# Patient Record
Sex: MALE | Race: BLACK OR AFRICAN AMERICAN | NOT HISPANIC OR LATINO | URBAN - METROPOLITAN AREA
[De-identification: names, ages, dates, MRNs, and addresses within clinical notes are randomized per-mention and may not be internally consistent; named-entity substitution may affect disease eponyms.]

---

## 2019-01-18 ENCOUNTER — INPATIENT (INPATIENT)
Facility: HOSPITAL | Age: 44
LOS: 1 days | Discharge: HOME | End: 2019-01-20
Attending: INTERNAL MEDICINE | Admitting: INTERNAL MEDICINE
Payer: COMMERCIAL

## 2019-01-18 VITALS
RESPIRATION RATE: 16 BRPM | TEMPERATURE: 98 F | SYSTOLIC BLOOD PRESSURE: 137 MMHG | DIASTOLIC BLOOD PRESSURE: 88 MMHG | HEART RATE: 83 BPM | OXYGEN SATURATION: 100 %

## 2019-01-18 RX ORDER — PANTOPRAZOLE SODIUM 20 MG/1
40 TABLET, DELAYED RELEASE ORAL ONCE
Qty: 0 | Refills: 0 | Status: COMPLETED | OUTPATIENT
Start: 2019-01-18 | End: 2019-01-18

## 2019-01-18 RX ORDER — ONDANSETRON 8 MG/1
4 TABLET, FILM COATED ORAL ONCE
Qty: 0 | Refills: 0 | Status: COMPLETED | OUTPATIENT
Start: 2019-01-18 | End: 2019-01-18

## 2019-01-18 RX ORDER — MORPHINE SULFATE 50 MG/1
2 CAPSULE, EXTENDED RELEASE ORAL ONCE
Qty: 0 | Refills: 0 | Status: DISCONTINUED | OUTPATIENT
Start: 2019-01-18 | End: 2019-01-18

## 2019-01-18 RX ORDER — SODIUM CHLORIDE 9 MG/ML
1000 INJECTION INTRAMUSCULAR; INTRAVENOUS; SUBCUTANEOUS ONCE
Qty: 0 | Refills: 0 | Status: COMPLETED | OUTPATIENT
Start: 2019-01-18 | End: 2019-01-18

## 2019-01-18 NOTE — ED PROVIDER NOTE - PHYSICAL EXAMINATION
GEN: Well appearing, well nourished, in no apparent distress.  HEAD: NCAT  HEENT: PERRL, Airway patent, non-erythematous pharynx, no exudates, uvula midline, MMM, neck supple, no LAD, no JVD, no scleral icterus   LUNG: CTAB, no adventitious sounds, no retractions, no nasal flaring  CV: RRR, no murmurs,   Abd: soft, TTP epigastric, ND, no rebound or guarding, BS+ in all quadrants, no CVAT  MSK: WWP, Pulses 2+ in extremities, No edema   Neuro:  AAOx3, Ambulatory with stable gait.  Skin: Warm and dry, no evidence of rash  Psych: normal mood and affect

## 2019-01-18 NOTE — ED PROVIDER NOTE - NS ED ROS FT
Constitutional: no fevers, no chills.  Eyes: no visual changes.  Ears: no ear drainage, no ear pain.  Nose: no nasal congestion.  Mouth/Throat: no sore throat.  Cardiovascular: no chest pain.  Respiratory: no shortness of breath, no wheezing, no cough  Gastrointestinal: no nausea, +vomiting, no diarrhea, +abdominal pain. +dark stools   MSK: no flank pain, no back pain.  Genitourinary: no dysuria, no hematuria.  Skin: no rashes.  Neuro: no headache,   Psychiatric: no known mental health issues.

## 2019-01-18 NOTE — ED PROVIDER NOTE - PROGRESS NOTE DETAILS
Dr Darling: + occult blood. Dr Darilng: + occult blood. RUQ shows dilated pancreatic duct and cholelithiasis. surgery and GI on board.

## 2019-01-18 NOTE — ED PROVIDER NOTE - OBJECTIVE STATEMENT
43 hx gerd and gallstones presents with epigastric pain since sunday. 1 episode of emesis that had some red streaks and some dark stools. Symptoms started after he ate food that he made, meatloaf. no one else ate same food. Denies f/x, cp, sob, dysuria, ha, change in skin,

## 2019-01-18 NOTE — ED PROVIDER NOTE - MEDICAL DECISION MAKING DETAILS
43 hx gerd and gallstones presents with epigastric pain since sunday. r/o cholecystitis with ruq sono. morphine for pain, zofran and pepcid, ivf, basic, lipase, t and s, h and h, occult blood, ekg

## 2019-01-18 NOTE — ED PROVIDER NOTE - ATTENDING CONTRIBUTION TO CARE
DR. GALLO, ATTENDING MD-  I performed a face to face bedside interview with patient regarding history of present illness, review of symptoms and past medical history. I completed an independent physical exam.  I have discussed patient's plan of care with the resident.   Documentation as above in the note.    44 y/o male h/o known gallstones here with abd pain, n/v x4 days.  Denies ha, neck stiffness, cp, sob, cough, dysuria, rash.  Afebrile, vs wnl, nad, ctabil, s1s2 rrr no m/r/g, abd soft ruq ttp no r/g, no cva tenderness b/l, no leg swelling b/l, no rash.  Biliary colic vs cholecystitis vs viral gastro.  Obtain cbc, cmp, lipase, vbg, ruq u/s, give ivf, pain med, antiemetic, reassess.

## 2019-01-19 ENCOUNTER — TRANSCRIPTION ENCOUNTER (OUTPATIENT)
Age: 44
End: 2019-01-19

## 2019-01-19 ENCOUNTER — RESULT REVIEW (OUTPATIENT)
Age: 44
End: 2019-01-19

## 2019-01-19 DIAGNOSIS — R10.9 UNSPECIFIED ABDOMINAL PAIN: ICD-10-CM

## 2019-01-19 LAB
ALBUMIN SERPL ELPH-MCNC: 4 G/DL — SIGNIFICANT CHANGE UP (ref 3.3–5)
ALP SERPL-CCNC: 67 U/L — SIGNIFICANT CHANGE UP (ref 40–120)
ALT FLD-CCNC: 43 U/L — HIGH (ref 4–41)
ANION GAP SERPL CALC-SCNC: 12 MMO/L — SIGNIFICANT CHANGE UP (ref 7–14)
APTT BLD: 33.7 SEC — SIGNIFICANT CHANGE UP (ref 27.5–36.3)
AST SERPL-CCNC: 30 U/L — SIGNIFICANT CHANGE UP (ref 4–40)
BASOPHILS # BLD AUTO: 0.04 K/UL — SIGNIFICANT CHANGE UP (ref 0–0.2)
BASOPHILS NFR BLD AUTO: 0.5 % — SIGNIFICANT CHANGE UP (ref 0–2)
BILIRUB SERPL-MCNC: 0.7 MG/DL — SIGNIFICANT CHANGE UP (ref 0.2–1.2)
BLD GP AB SCN SERPL QL: NEGATIVE — SIGNIFICANT CHANGE UP
BUN SERPL-MCNC: 12 MG/DL — SIGNIFICANT CHANGE UP (ref 7–23)
CALCIUM SERPL-MCNC: 9.8 MG/DL — SIGNIFICANT CHANGE UP (ref 8.4–10.5)
CHLORIDE SERPL-SCNC: 100 MMOL/L — SIGNIFICANT CHANGE UP (ref 98–107)
CO2 SERPL-SCNC: 27 MMOL/L — SIGNIFICANT CHANGE UP (ref 22–31)
CREAT SERPL-MCNC: 1.36 MG/DL — HIGH (ref 0.5–1.3)
EOSINOPHIL # BLD AUTO: 0.16 K/UL — SIGNIFICANT CHANGE UP (ref 0–0.5)
EOSINOPHIL NFR BLD AUTO: 2 % — SIGNIFICANT CHANGE UP (ref 0–6)
GLUCOSE SERPL-MCNC: 105 MG/DL — HIGH (ref 70–99)
HCT VFR BLD CALC: 46.4 % — SIGNIFICANT CHANGE UP (ref 39–50)
HGB BLD-MCNC: 14.8 G/DL — SIGNIFICANT CHANGE UP (ref 13–17)
IMM GRANULOCYTES NFR BLD AUTO: 0.3 % — SIGNIFICANT CHANGE UP (ref 0–1.5)
INR BLD: 1.05 — SIGNIFICANT CHANGE UP (ref 0.88–1.17)
LIDOCAIN IGE QN: 21.2 U/L — SIGNIFICANT CHANGE UP (ref 7–60)
LYMPHOCYTES # BLD AUTO: 1.64 K/UL — SIGNIFICANT CHANGE UP (ref 1–3.3)
LYMPHOCYTES # BLD AUTO: 20.9 % — SIGNIFICANT CHANGE UP (ref 13–44)
MCHC RBC-ENTMCNC: 29.9 PG — SIGNIFICANT CHANGE UP (ref 27–34)
MCHC RBC-ENTMCNC: 31.9 % — LOW (ref 32–36)
MCV RBC AUTO: 93.7 FL — SIGNIFICANT CHANGE UP (ref 80–100)
MONOCYTES # BLD AUTO: 1.03 K/UL — HIGH (ref 0–0.9)
MONOCYTES NFR BLD AUTO: 13.2 % — SIGNIFICANT CHANGE UP (ref 2–14)
NEUTROPHILS # BLD AUTO: 4.94 K/UL — SIGNIFICANT CHANGE UP (ref 1.8–7.4)
NEUTROPHILS NFR BLD AUTO: 63.1 % — SIGNIFICANT CHANGE UP (ref 43–77)
NRBC # FLD: 0 K/UL — LOW (ref 25–125)
OB PNL STL: POSITIVE — SIGNIFICANT CHANGE UP
PLATELET # BLD AUTO: 298 K/UL — SIGNIFICANT CHANGE UP (ref 150–400)
PMV BLD: 10.4 FL — SIGNIFICANT CHANGE UP (ref 7–13)
POTASSIUM SERPL-MCNC: 4.2 MMOL/L — SIGNIFICANT CHANGE UP (ref 3.5–5.3)
POTASSIUM SERPL-SCNC: 4.2 MMOL/L — SIGNIFICANT CHANGE UP (ref 3.5–5.3)
PROT SERPL-MCNC: 6.8 G/DL — SIGNIFICANT CHANGE UP (ref 6–8.3)
PROTHROM AB SERPL-ACNC: 12 SEC — SIGNIFICANT CHANGE UP (ref 9.8–13.1)
RBC # BLD: 4.95 M/UL — SIGNIFICANT CHANGE UP (ref 4.2–5.8)
RBC # FLD: 12.6 % — SIGNIFICANT CHANGE UP (ref 10.3–14.5)
RH IG SCN BLD-IMP: POSITIVE — SIGNIFICANT CHANGE UP
SODIUM SERPL-SCNC: 139 MMOL/L — SIGNIFICANT CHANGE UP (ref 135–145)
WBC # BLD: 7.83 K/UL — SIGNIFICANT CHANGE UP (ref 3.8–10.5)
WBC # FLD AUTO: 7.83 K/UL — SIGNIFICANT CHANGE UP (ref 3.8–10.5)

## 2019-01-19 PROCEDURE — 99232 SBSQ HOSP IP/OBS MODERATE 35: CPT | Mod: GC

## 2019-01-19 PROCEDURE — 99222 1ST HOSP IP/OBS MODERATE 55: CPT | Mod: GC,57

## 2019-01-19 PROCEDURE — 74183 MRI ABD W/O CNTR FLWD CNTR: CPT | Mod: 26

## 2019-01-19 PROCEDURE — 76705 ECHO EXAM OF ABDOMEN: CPT | Mod: 26

## 2019-01-19 RX ORDER — HEPARIN SODIUM 5000 [USP'U]/ML
5000 INJECTION INTRAVENOUS; SUBCUTANEOUS EVERY 8 HOURS
Qty: 0 | Refills: 0 | Status: DISCONTINUED | OUTPATIENT
Start: 2019-01-19 | End: 2019-01-20

## 2019-01-19 RX ORDER — ONDANSETRON 8 MG/1
4 TABLET, FILM COATED ORAL ONCE
Qty: 0 | Refills: 0 | Status: COMPLETED | OUTPATIENT
Start: 2019-01-19 | End: 2019-01-19

## 2019-01-19 RX ORDER — MORPHINE SULFATE 50 MG/1
4 CAPSULE, EXTENDED RELEASE ORAL ONCE
Qty: 0 | Refills: 0 | Status: DISCONTINUED | OUTPATIENT
Start: 2019-01-19 | End: 2019-01-19

## 2019-01-19 RX ORDER — ENOXAPARIN SODIUM 100 MG/ML
40 INJECTION SUBCUTANEOUS DAILY
Qty: 0 | Refills: 0 | Status: DISCONTINUED | OUTPATIENT
Start: 2019-01-19 | End: 2019-01-19

## 2019-01-19 RX ORDER — ONDANSETRON 8 MG/1
4 TABLET, FILM COATED ORAL EVERY 6 HOURS
Qty: 0 | Refills: 0 | Status: DISCONTINUED | OUTPATIENT
Start: 2019-01-19 | End: 2019-01-20

## 2019-01-19 RX ORDER — CEFOTETAN DISODIUM 1 G
VIAL (EA) INJECTION
Qty: 0 | Refills: 0 | Status: DISCONTINUED | OUTPATIENT
Start: 2019-01-19 | End: 2019-01-20

## 2019-01-19 RX ORDER — CEFOTETAN DISODIUM 1 G
2 VIAL (EA) INJECTION EVERY 12 HOURS
Qty: 0 | Refills: 0 | Status: DISCONTINUED | OUTPATIENT
Start: 2019-01-19 | End: 2019-01-20

## 2019-01-19 RX ORDER — CEFOTETAN DISODIUM 1 G
2 VIAL (EA) INJECTION ONCE
Qty: 0 | Refills: 0 | Status: COMPLETED | OUTPATIENT
Start: 2019-01-19 | End: 2019-01-19

## 2019-01-19 RX ORDER — ACETAMINOPHEN 500 MG
1000 TABLET ORAL ONCE
Qty: 0 | Refills: 0 | Status: DISCONTINUED | OUTPATIENT
Start: 2019-01-19 | End: 2019-01-20

## 2019-01-19 RX ORDER — SODIUM CHLORIDE 9 MG/ML
1000 INJECTION, SOLUTION INTRAVENOUS
Qty: 0 | Refills: 0 | Status: DISCONTINUED | OUTPATIENT
Start: 2019-01-19 | End: 2019-01-20

## 2019-01-19 RX ADMIN — MORPHINE SULFATE 4 MILLIGRAM(S): 50 CAPSULE, EXTENDED RELEASE ORAL at 04:20

## 2019-01-19 RX ADMIN — Medication 100 GRAM(S): at 21:34

## 2019-01-19 RX ADMIN — Medication 100 GRAM(S): at 10:06

## 2019-01-19 RX ADMIN — ONDANSETRON 4 MILLIGRAM(S): 8 TABLET, FILM COATED ORAL at 00:44

## 2019-01-19 RX ADMIN — ENOXAPARIN SODIUM 40 MILLIGRAM(S): 100 INJECTION SUBCUTANEOUS at 05:55

## 2019-01-19 RX ADMIN — SODIUM CHLORIDE 1000 MILLILITER(S): 9 INJECTION INTRAMUSCULAR; INTRAVENOUS; SUBCUTANEOUS at 02:22

## 2019-01-19 RX ADMIN — MORPHINE SULFATE 2 MILLIGRAM(S): 50 CAPSULE, EXTENDED RELEASE ORAL at 01:00

## 2019-01-19 RX ADMIN — MORPHINE SULFATE 4 MILLIGRAM(S): 50 CAPSULE, EXTENDED RELEASE ORAL at 02:22

## 2019-01-19 RX ADMIN — SODIUM CHLORIDE 1000 MILLILITER(S): 9 INJECTION INTRAMUSCULAR; INTRAVENOUS; SUBCUTANEOUS at 00:46

## 2019-01-19 RX ADMIN — MORPHINE SULFATE 2 MILLIGRAM(S): 50 CAPSULE, EXTENDED RELEASE ORAL at 00:44

## 2019-01-19 RX ADMIN — ONDANSETRON 4 MILLIGRAM(S): 8 TABLET, FILM COATED ORAL at 02:22

## 2019-01-19 RX ADMIN — PANTOPRAZOLE SODIUM 40 MILLIGRAM(S): 20 TABLET, DELAYED RELEASE ORAL at 00:46

## 2019-01-19 RX ADMIN — SODIUM CHLORIDE 125 MILLILITER(S): 9 INJECTION, SOLUTION INTRAVENOUS at 05:57

## 2019-01-19 NOTE — H&P ADULT - HISTORY OF PRESENT ILLNESS
43M no PMH PSH presents with 4 days of abdominal pain. The pain is in the upper abdomen and radiates around the RUQ to the back. The pain started after a meal 4 days ago and has been intermittent but worsened over the past day and now associated with emesis. Patient endorses chills, no cp/sob/d.

## 2019-01-19 NOTE — CONSULT NOTE ADULT - ASSESSMENT
IMPRESSION  - Abdominal pain in the setting of acute cholecystitis   - US concerning for dilated proximal pancreatic duct   - ANG, creat 1.3    RECOMMENDATION    - trend CBC, CMP  - please obtain an MRCP to evaluate the pancreatic duct and CBD  - continue IVF, consider antibiotics (broad spectrum)  - supportive care as per primary team      Ijeoma Ball, PGY-5  GI fellow  B- [id number]/ [phone]  Please call GI fellow on call after 5pm and on weekends IMPRESSION  - Abdominal pain in the setting of possible acute cholecystitis Ddx PUD, erosive gastritis  - US concerning for dilated proximal pancreatic duct   - ANG, creat 1.3    RECOMMENDATION    - trend CBC, CMP  - pending MRI/MRCP to evaluate the pancreatic duct and CBD  - continue IVF, consider antibiotics (broad spectrum)  - supportive care as per primary team      Ijeoma Ball, PGY-5  GI fellow  B- [id number]/ [phone]  Please call GI fellow on call after 5pm and on weekends IMPRESSION  - Abdominal pain in the setting gallstones; afebrile, T bili is normal, MRCP with no choledocholithiasis   - US concerning for dilated proximal pancreatic duct, MRCP with normal pancreatic duct and CBD  - ANG, creat 1.3    RECOMMENDATION    - trend CBC, CMP  - no plan for endoscopic intervention   - further care per surgery   - supportive care as per primary team    Please call back as needed     Ijeoma Ball, PGY-5  GI fellow  B- [id number]/ [phone]  Please call GI fellow on call after 5pm and on weekends IMPRESSION  - Abdominal pain in the setting gallstones; afebrile, T bili is normal, MRCP with no choledocholithiasis   - US concerning for dilated proximal pancreatic duct, MRCP with normal pancreatic duct and CBD  - ANG, creat 1.3    RECOMMENDATION    - trend CBC, CMP  - no plan for endoscopic intervention   - further care per surgery   - supportive care as per primary team    Please call back as needed.    Ijeoma Ball, PGY-5  GI fellow  B- [phone]/ [phone]  Please call GI fellow on call after 5pm and on weekends

## 2019-01-19 NOTE — H&P ADULT - ASSESSMENT
43M no pmh with acute cholecystitis    - Admit to Dr. Mccarthy  - IV cefotetan  - NPO  - IVF  - Pain control IV prn  - Nausea control IV prn  - Pre-op  - Will discuss with attending possibility of MRI 2/2 dilated ducts    B team

## 2019-01-19 NOTE — ED ADULT NURSE REASSESSMENT NOTE - NS ED NURSE REASSESS COMMENT FT1
Pt received room 11.  AOX4, skin warm, dry and intact.  Pt currently w/o complaints.  Abdomen, soft and non-tender at this time.  Right AC 20 with LR in progress.  Site is dry and intact.  Pt instructed on need to be NPO, pt verbalized understanding of same.  Pt to MRI via stretcher.

## 2019-01-19 NOTE — H&P ADULT - NSHPPHYSICALEXAM_GEN_ALL_CORE
PHYSICAL EXAM:  GENERAL: NAD, well-developed  HEAD:  Atraumatic, Normocephalic  EYES: EOMI, conjunctiva and sclera clear  NECK: Supple, No JVD  CHEST/LUNG: Clear to auscultation bilaterally; No wheeze  HEART: Regular rate and rhythm; No murmurs, rubs, or gallops  ABDOMEN: Soft, tender in the RUQ and epigastrum   EXTREMITIES:  2+ Peripheral Pulses, No clubbing, cyanosis, or edema  PSYCH: AAOx3  NEUROLOGY: non-focal  SKIN: No rashes or lesions

## 2019-01-19 NOTE — H&P ADULT - ATTENDING COMMENTS
I saw and examined the patient and agree with the above note.    Patient with exam consistent with acute calculous cholecystitis but with slight ductal dilation (0.7mm):  -OR planning for laparoscopic cholecystectomy only after MRCP  -Abx, resuscitation and labs  -NPO/IVF  -Pain control via IV meds    I spent 45min reviewing data, images and documentation as well as in care coordination.  Greater than 50% of my time was spent in discussion regarding pre- and post-operative care as well as risk and benefits of operative intervention.    Ronnie Mccarthy MD   Acute and Critical Care Surgery  (Cell: [phone])  Email: [email or web address]    The Acute Care Surgery (B Team) Attending Group Practice:  Dr. Oswaldo Friedman, Dr. Ventura Lawson, Dr. Yenni Alejo, Dr. Ronnie Mccarthy    Urgent issues - spectra [id number] or [id number]  Nonurgent issues - [phone]  Patient appointments or after hours - [phone]

## 2019-01-19 NOTE — CONSULT NOTE ADULT - SUBJECTIVE AND OBJECTIVE BOX
Chief Complaint:  Patient is a 43y old  Male who presents with a chief complaint of Cholecystitis (19 Jan 2019 04:17)      HPI:  43 year old male with no significant past medical history presented with abdominal pain for 4 days.    As per the patient, pain is in the upper abdomen and radiates around the RUQ to the back. The pain started after a meal 4 days ago and has been intermittent but worsened over the past day and now associated with emesis.       Allergies:  No Known Allergies      Home Medications:  No Current Medications as of 19-Jan-2019 04:19 documented in Structured Notes        Hospital Medications:  acetaminophen  IVPB .. 1000 milliGRAM(s) IV Intermittent Once PRN  heparin  Injectable 5000 Unit(s) SubCutaneous every 8 hours  lactated ringers. 1000 milliLiter(s) IV Continuous <Continuous>  ondansetron Injectable 4 milliGRAM(s) IV Push every 6 hours PRN      PMHX/PSHX:  No pertinent past medical history  No significant past surgical history      Family history:  No pertinent family history in first degree relatives      Social History:   Socially drinks alcohol  Denies smoking or drug use    ROS:     General:  No wt loss, fevers, chills, night sweats, fatigue,   Eyes:  Good vision, no reported pain  ENT:  No sore throat, pain, runny nose, dysphagia  CV:  No pain, palpitations, hypo/hypertension  Resp:  No dyspnea, cough, tachypnea, wheezing  GI:  See HPI  :  No pain, bleeding, incontinence, nocturia  Muscle:  No pain, weakness  Neuro:  No weakness, tingling, memory problems  Psych:  No fatigue, insomnia, mood problems, depression  Endocrine:  No polyuria, polydipsia, cold/heat intolerance  Heme:  No petechiae, ecchymosis, easy bruisability  Skin:  No rash, edema      PHYSICAL EXAM:     GENERAL:  Appears stated age, well-groomed, well-nourished, no distress  HEENT:  NC/AT,  conjunctivae clear and pink,  no JVD  CHEST:  Full & symmetric excursion, no increased effort, breath sounds clear  HEART:  Regular rhythm, S1, S2, no murmur/rub/S3/S4, no abdominal bruit, no edema  ABDOMEN:  Soft, non-tender, non-distended, normoactive bowel sounds,  no masses ,  EXTREMITIES:  no cyanosis,clubbing or edema  SKIN:  No rash/erythema/ecchymoses/petechiae/wounds/abscess/warm/dry  NEURO:  Alert, oriented    Vital Signs:  Vital Signs Last 24 Hrs  T(C): 36.4 (19 Jan 2019 08:24), Max: 36.9 (18 Jan 2019 23:17)  T(F): 97.6 (19 Jan 2019 08:24), Max: 98.5 (18 Jan 2019 23:17)  HR: 79 (19 Jan 2019 08:24) (62 - 84)  BP: 119/64 (19 Jan 2019 08:24) (119/64 - 164/85)  BP(mean): --  RR: 16 (19 Jan 2019 08:24) (16 - 16)  SpO2: 100% (19 Jan 2019 08:24) (100% - 100%)  Daily     Daily     LABS:                        14.8   7.83  )-----------( 298      ( 19 Jan 2019 00:45 )             46.4     01-19    139  |  100  |  12  ----------------------------<  105<H>  4.2   |  27  |  1.36<H>    Ca    9.8      19 Jan 2019 00:40    TPro  6.8  /  Alb  4.0  /  TBili  0.7  /  DBili  x   /  AST  30  /  ALT  43<H>  /  AlkPhos  67  01-19    LIVER FUNCTIONS - ( 19 Jan 2019 00:40 )  Alb: 4.0 g/dL / Pro: 6.8 g/dL / ALK PHOS: 67 u/L / ALT: 43 u/L / AST: 30 u/L / GGT: x           PT/INR - ( 19 Jan 2019 00:35 )   PT: 12.0 SEC;   INR: 1.05          PTT - ( 19 Jan 2019 00:35 )  PTT:33.7 SEC    Amylase Serum--      Lipase serum21.2       Ammonia--      Imaging:    < from: US Abdomen Limited (01.19.19 @ 03:19) >    LIVER: 17.1 cm in length. Diffusely increased echogenicity, likely fatty   infiltration.  BILIARY: No intrahepatic biliary dilatation. Dilated visualized proximal   common bile duct measuring up to 0.7 cm.   GALLBLADDER: Cholelithiasis. No significant gallbladder wall thickening   or pericholecystic fluid. Negative sonographic Martinez sign. However, the   patient was premedicated.  PANCREAS: Poorly visualized due to bowel gas.  RIGHT KIDNEY: 8.7 cm in length. No hydronephrosis or obvious renal stone.   ADDITIONAL: No ascites.     Impression:      Sonographic findings equivocal for acute cholecystitis. If there is   clinical suspicion for acute cholecystitis, a hepatobiliary scan may be   obtained for further evaluation.    Dilated visualized proximal pancreatic duct. Recommend clinical   correlation (LFT) and additional imaging (MRCP) if there is clinical   suspicion for choledocholithiasis.    Fatty infiltration of the liver.    Poorly visualized pancreas.    < end of copied text > Chief Complaint:  Patient is a 43y old  Male who presents with a chief complaint of Cholecystitis (19 Jan 2019 04:17)      HPI:  43 year old male with no significant past medical history presented with abdominal pain for 4 days.    As per the patient, pain is in the upper abdomen and radiates around the RUQ to the back. The pain started after a meal last week and has been intermittent but worsened over the past day. He has not been able to consume po due to the PO. He complains of nausea, had an episodes of NBNB emesis. He has been trying to lose weight and has lost 25 lbs over the past year. He has no changes in BMs. He denies blood in stool or melena.     Never had an EGD or colonoscopy.  Saw a GI doctor >10 years ago, doesnt remember details.       Allergies:  No Known Allergies      Home Medications:  No Current Medications as of 19-Jan-2019 04:19 documented in Structured Notes        Hospital Medications:  acetaminophen  IVPB .. 1000 milliGRAM(s) IV Intermittent Once PRN  heparin  Injectable 5000 Unit(s) SubCutaneous every 8 hours  lactated ringers. 1000 milliLiter(s) IV Continuous <Continuous>  ondansetron Injectable 4 milliGRAM(s) IV Push every 6 hours PRN      PMHX/PSHX:  No pertinent past medical history  No significant past surgical history      Family history:  No pertinent family history in first degree relatives  Denies family history of colon cancer, liver cancer, uterine cancer, ovarian cancer, breast cancer, gastric ulcers, colon polyps, gallstones    Social History:   Socially drinks alcohol  Denies smoking or drug use    ROS:     General:  No wt loss, fevers, chills, night sweats, fatigue,   Eyes:  Good vision, no reported pain  ENT:  No sore throat, pain, runny nose, dysphagia  CV:  No pain, palpitations, hypo/hypertension  Resp:  No dyspnea, cough, tachypnea, wheezing  GI:  See HPI  :  No pain, bleeding, incontinence, nocturia  Muscle:  No pain, weakness  Neuro:  No weakness, tingling, memory problems  Psych:  No fatigue, insomnia, mood problems, depression  Endocrine:  No polyuria, polydipsia, cold/heat intolerance  Heme:  No petechiae, ecchymosis, easy bruisability  Skin:  No rash, edema      PHYSICAL EXAM:     GENERAL:  Appears stated age, well-groomed, well-nourished, no distress  HEENT:  NC/AT,  conjunctivae clear and pink,  no JVD  CHEST:  Full & symmetric excursion, no increased effort, breath sounds clear  HEART:  Regular rhythm, S1, S2, no murmur/rub/S3/S4, no abdominal bruit, no edema  ABDOMEN:  Soft, non-tender, non-distended, normoactive bowel sounds,  no masses ,  EXTREMITIES:  no cyanosis,clubbing or edema  SKIN:  No rash/erythema/ecchymoses/petechiae/wounds/abscess/warm/dry  NEURO:  Alert, oriented    Vital Signs:  Vital Signs Last 24 Hrs  T(C): 36.4 (19 Jan 2019 08:24), Max: 36.9 (18 Jan 2019 23:17)  T(F): 97.6 (19 Jan 2019 08:24), Max: 98.5 (18 Jan 2019 23:17)  HR: 79 (19 Jan 2019 08:24) (62 - 84)  BP: 119/64 (19 Jan 2019 08:24) (119/64 - 164/85)  BP(mean): --  RR: 16 (19 Jan 2019 08:24) (16 - 16)  SpO2: 100% (19 Jan 2019 08:24) (100% - 100%)  Daily     Daily     LABS:                        14.8   7.83  )-----------( 298      ( 19 Jan 2019 00:45 )             46.4     01-19    139  |  100  |  12  ----------------------------<  105<H>  4.2   |  27  |  1.36<H>    Ca    9.8      19 Jan 2019 00:40    TPro  6.8  /  Alb  4.0  /  TBili  0.7  /  DBili  x   /  AST  30  /  ALT  43<H>  /  AlkPhos  67  01-19    LIVER FUNCTIONS - ( 19 Jan 2019 00:40 )  Alb: 4.0 g/dL / Pro: 6.8 g/dL / ALK PHOS: 67 u/L / ALT: 43 u/L / AST: 30 u/L / GGT: x           PT/INR - ( 19 Jan 2019 00:35 )   PT: 12.0 SEC;   INR: 1.05          PTT - ( 19 Jan 2019 00:35 )  PTT:33.7 SEC    Amylase Serum--      Lipase serum21.2       Ammonia--      Imaging:    < from: US Abdomen Limited (01.19.19 @ 03:19) >    LIVER: 17.1 cm in length. Diffusely increased echogenicity, likely fatty   infiltration.  BILIARY: No intrahepatic biliary dilatation. Dilated visualized proximal   common bile duct measuring up to 0.7 cm.   GALLBLADDER: Cholelithiasis. No significant gallbladder wall thickening   or pericholecystic fluid. Negative sonographic Martinez sign. However, the   patient was premedicated.  PANCREAS: Poorly visualized due to bowel gas.  RIGHT KIDNEY: 8.7 cm in length. No hydronephrosis or obvious renal stone.   ADDITIONAL: No ascites.     Impression:    Sonographic findings equivocal for acute cholecystitis. If there is clinical suspicion for acute cholecystitis, a hepatobiliary scan may be obtained for further evaluation.  Dilated visualized proximal pancreatic duct. Recommend clinical correlation (LFT) and additional imaging (MRCP) if there is clinical suspicion for choledocholithiasis.  Fatty infiltration of the liver.  Poorly visualized pancreas.  < end of copied text >

## 2019-01-19 NOTE — H&P ADULT - NSHPLABSRESULTS_GEN_ALL_CORE
Vital Signs Last 24 Hrs  T(C): 36.9 (18 Jan 2019 23:17), Max: 36.9 (18 Jan 2019 23:17)  T(F): 98.5 (18 Jan 2019 23:17), Max: 98.5 (18 Jan 2019 23:17)  HR: 75 (19 Jan 2019 02:22) (75 - 84)  BP: 137/74 (19 Jan 2019 02:22) (137/74 - 164/85)  BP(mean): --  RR: 16 (19 Jan 2019 02:22) (16 - 16)  SpO2: 100% (19 Jan 2019 02:22) (100% - 100%)      LABS:                        14.8   7.83  )-----------( 298      ( 19 Jan 2019 00:45 )             46.4     01-19    139  |  100  |  12  ----------------------------<  105<H>  4.2   |  27  |  1.36<H>    Ca    9.8      19 Jan 2019 00:40    TPro  6.8  /  Alb  4.0  /  TBili  0.7  /  DBili  x   /  AST  30  /  ALT  43<H>  /  AlkPhos  67  01-19    PT/INR - ( 19 Jan 2019 00:35 )   PT: 12.0 SEC;   INR: 1.05          PTT - ( 19 Jan 2019 00:35 )  PTT:33.7 SEC      INs and OUTs:    < from: US Abdomen Limited (01.19.19 @ 03:19) >    Impression:      Sonographic findings equivocal for acute cholecystitis. If there is   clinical suspicion for acute cholecystitis, a hepatobiliary scan may be   obtained for further evaluation.    Dilated visualized proximal pancreatic duct. Recommend clinical   correlation (LFT) and additional imaging (MRCP) if there is clinical   suspicion for choledocholithiasis.    Fatty infiltration of the liver.    Poorly visualized pancreas.    < end of copied text >

## 2019-01-20 ENCOUNTER — TRANSCRIPTION ENCOUNTER (OUTPATIENT)
Age: 44
End: 2019-01-20

## 2019-01-20 VITALS
HEART RATE: 73 BPM | TEMPERATURE: 98 F | OXYGEN SATURATION: 99 % | RESPIRATION RATE: 19 BRPM | SYSTOLIC BLOOD PRESSURE: 138 MMHG | DIASTOLIC BLOOD PRESSURE: 89 MMHG

## 2019-01-20 LAB
ALBUMIN SERPL ELPH-MCNC: 3.4 G/DL — SIGNIFICANT CHANGE UP (ref 3.3–5)
ALP SERPL-CCNC: 67 U/L — SIGNIFICANT CHANGE UP (ref 40–120)
ALT FLD-CCNC: 49 U/L — HIGH (ref 4–41)
ANION GAP SERPL CALC-SCNC: 11 MMO/L — SIGNIFICANT CHANGE UP (ref 7–14)
AST SERPL-CCNC: 37 U/L — SIGNIFICANT CHANGE UP (ref 4–40)
BILIRUB DIRECT SERPL-MCNC: 0.2 MG/DL — SIGNIFICANT CHANGE UP (ref 0.1–0.2)
BILIRUB SERPL-MCNC: 0.6 MG/DL — SIGNIFICANT CHANGE UP (ref 0.2–1.2)
BUN SERPL-MCNC: 11 MG/DL — SIGNIFICANT CHANGE UP (ref 7–23)
CALCIUM SERPL-MCNC: 8.8 MG/DL — SIGNIFICANT CHANGE UP (ref 8.4–10.5)
CHLORIDE SERPL-SCNC: 99 MMOL/L — SIGNIFICANT CHANGE UP (ref 98–107)
CO2 SERPL-SCNC: 28 MMOL/L — SIGNIFICANT CHANGE UP (ref 22–31)
CREAT SERPL-MCNC: 1.29 MG/DL — SIGNIFICANT CHANGE UP (ref 0.5–1.3)
GLUCOSE SERPL-MCNC: 112 MG/DL — HIGH (ref 70–99)
HCT VFR BLD CALC: 41.4 % — SIGNIFICANT CHANGE UP (ref 39–50)
HGB BLD-MCNC: 13.8 G/DL — SIGNIFICANT CHANGE UP (ref 13–17)
MAGNESIUM SERPL-MCNC: 2.1 MG/DL — SIGNIFICANT CHANGE UP (ref 1.6–2.6)
MCHC RBC-ENTMCNC: 30.5 PG — SIGNIFICANT CHANGE UP (ref 27–34)
MCHC RBC-ENTMCNC: 33.3 % — SIGNIFICANT CHANGE UP (ref 32–36)
MCV RBC AUTO: 91.6 FL — SIGNIFICANT CHANGE UP (ref 80–100)
NRBC # FLD: 0 K/UL — LOW (ref 25–125)
PHOSPHATE SERPL-MCNC: 4.8 MG/DL — HIGH (ref 2.5–4.5)
PLATELET # BLD AUTO: 260 K/UL — SIGNIFICANT CHANGE UP (ref 150–400)
PMV BLD: 10.2 FL — SIGNIFICANT CHANGE UP (ref 7–13)
POTASSIUM SERPL-MCNC: 4.7 MMOL/L — SIGNIFICANT CHANGE UP (ref 3.5–5.3)
POTASSIUM SERPL-SCNC: 4.7 MMOL/L — SIGNIFICANT CHANGE UP (ref 3.5–5.3)
PROT SERPL-MCNC: 6 G/DL — SIGNIFICANT CHANGE UP (ref 6–8.3)
RBC # BLD: 4.52 M/UL — SIGNIFICANT CHANGE UP (ref 4.2–5.8)
RBC # FLD: 12.4 % — SIGNIFICANT CHANGE UP (ref 10.3–14.5)
SODIUM SERPL-SCNC: 138 MMOL/L — SIGNIFICANT CHANGE UP (ref 135–145)
WBC # BLD: 6.54 K/UL — SIGNIFICANT CHANGE UP (ref 3.8–10.5)
WBC # FLD AUTO: 6.54 K/UL — SIGNIFICANT CHANGE UP (ref 3.8–10.5)

## 2019-01-20 PROCEDURE — 88304 TISSUE EXAM BY PATHOLOGIST: CPT | Mod: 26

## 2019-01-20 PROCEDURE — 47562 LAPAROSCOPIC CHOLECYSTECTOMY: CPT | Mod: GC

## 2019-01-20 RX ORDER — SODIUM CHLORIDE 9 MG/ML
1000 INJECTION, SOLUTION INTRAVENOUS
Qty: 0 | Refills: 0 | Status: DISCONTINUED | OUTPATIENT
Start: 2019-01-20 | End: 2019-01-20

## 2019-01-20 RX ORDER — OXYCODONE HYDROCHLORIDE 5 MG/1
10 TABLET ORAL EVERY 4 HOURS
Qty: 0 | Refills: 0 | Status: DISCONTINUED | OUTPATIENT
Start: 2019-01-20 | End: 2019-01-20

## 2019-01-20 RX ORDER — ACETAMINOPHEN 500 MG
975 TABLET ORAL EVERY 6 HOURS
Qty: 0 | Refills: 0 | Status: DISCONTINUED | OUTPATIENT
Start: 2019-01-20 | End: 2019-01-20

## 2019-01-20 RX ORDER — ONDANSETRON 8 MG/1
4 TABLET, FILM COATED ORAL ONCE
Qty: 0 | Refills: 0 | Status: DISCONTINUED | OUTPATIENT
Start: 2019-01-20 | End: 2019-01-20

## 2019-01-20 RX ORDER — OXYCODONE HYDROCHLORIDE 5 MG/1
5 TABLET ORAL EVERY 4 HOURS
Qty: 0 | Refills: 0 | Status: DISCONTINUED | OUTPATIENT
Start: 2019-01-20 | End: 2019-01-20

## 2019-01-20 RX ORDER — HYDROMORPHONE HYDROCHLORIDE 2 MG/ML
0.5 INJECTION INTRAMUSCULAR; INTRAVENOUS; SUBCUTANEOUS
Qty: 0 | Refills: 0 | Status: DISCONTINUED | OUTPATIENT
Start: 2019-01-20 | End: 2019-01-20

## 2019-01-20 RX ORDER — OXYCODONE HYDROCHLORIDE 5 MG/1
1 TABLET ORAL
Qty: 20 | Refills: 0
Start: 2019-01-20

## 2019-01-20 RX ORDER — HYDROMORPHONE HYDROCHLORIDE 2 MG/ML
1 INJECTION INTRAMUSCULAR; INTRAVENOUS; SUBCUTANEOUS
Qty: 0 | Refills: 0 | Status: DISCONTINUED | OUTPATIENT
Start: 2019-01-20 | End: 2019-01-20

## 2019-01-20 RX ADMIN — Medication 975 MILLIGRAM(S): at 06:00

## 2019-01-20 RX ADMIN — SODIUM CHLORIDE 125 MILLILITER(S): 9 INJECTION, SOLUTION INTRAVENOUS at 01:34

## 2019-01-20 RX ADMIN — HYDROMORPHONE HYDROCHLORIDE 0.5 MILLIGRAM(S): 2 INJECTION INTRAMUSCULAR; INTRAVENOUS; SUBCUTANEOUS at 01:59

## 2019-01-20 RX ADMIN — OXYCODONE HYDROCHLORIDE 10 MILLIGRAM(S): 5 TABLET ORAL at 15:42

## 2019-01-20 RX ADMIN — HEPARIN SODIUM 5000 UNIT(S): 5000 INJECTION INTRAVENOUS; SUBCUTANEOUS at 05:12

## 2019-01-20 RX ADMIN — Medication 975 MILLIGRAM(S): at 12:16

## 2019-01-20 RX ADMIN — Medication 975 MILLIGRAM(S): at 12:45

## 2019-01-20 RX ADMIN — OXYCODONE HYDROCHLORIDE 10 MILLIGRAM(S): 5 TABLET ORAL at 15:27

## 2019-01-20 RX ADMIN — HEPARIN SODIUM 5000 UNIT(S): 5000 INJECTION INTRAVENOUS; SUBCUTANEOUS at 13:26

## 2019-01-20 RX ADMIN — OXYCODONE HYDROCHLORIDE 10 MILLIGRAM(S): 5 TABLET ORAL at 08:23

## 2019-01-20 RX ADMIN — HYDROMORPHONE HYDROCHLORIDE 0.5 MILLIGRAM(S): 2 INJECTION INTRAMUSCULAR; INTRAVENOUS; SUBCUTANEOUS at 02:30

## 2019-01-20 RX ADMIN — OXYCODONE HYDROCHLORIDE 10 MILLIGRAM(S): 5 TABLET ORAL at 08:53

## 2019-01-20 RX ADMIN — Medication 975 MILLIGRAM(S): at 05:12

## 2019-01-20 NOTE — DISCHARGE NOTE ADULT - CARE PROVIDER_API CALL
Ronnie Mccarthy), Surgery; Surgical Critical Care  [address] [address] [place] [address]  Phone: [phone]  Fax: [phone]

## 2019-01-20 NOTE — DISCHARGE NOTE ADULT - INSTRUCTIONS
low fat diet Report of any redness, drainage, swelling, fever, chills or pain not relieved by pain medication. Report increased abdominal distension, nausea, vomiting, diarrhea to the provider.

## 2019-01-20 NOTE — DISCHARGE NOTE ADULT - PLAN OF CARE
Resolution of abd pain Laparoscopic cholecystectomy performed.   Please take pain medications as prescribed.   Please follow up with Dr. Mccarthy in 2 wks.

## 2019-01-20 NOTE — BRIEF OPERATIVE NOTE - PROCEDURE
<<-----Click on this checkbox to enter Procedure Laparoscopic cholecystectomy  01/20/2019    Active  [id number]

## 2019-01-20 NOTE — DISCHARGE NOTE ADULT - HOSPITAL COURSE
44 yo M with no PMHx or PSHx who presented with abd pain. US demonstrated acute cholecystitis. MRCP showed a normal pancreatic duct and CBD, no cholelithiasis. Tbili levels were normal, pt is afebrile. Pt went to the OR on 1/20/18 for lap cholecystectomy. Patient tolerated the procedure well and was transferred to the floor in stable condition.  On POD #1, the patient's diet was advanced as tolerated and was placed on PO pain medication.  Once patient was ambulating well, voiding without difficulty, and tolerating a full diet, they were found to be stable for discharge to home.  Pain was well-controlled at time of discharge. Pt was instructed to take pain medications as prescribed and to follow-up with Dr. Mccarthy in 2wks.

## 2019-01-20 NOTE — PROGRESS NOTE ADULT - ASSESSMENT
42yo M w/ no PMH or PSH presented 1/18 w/ 4 days of abd pain & more recent emesis & chills. The pain is in the upper abdomen and radiates around the RUQ to the back. MRCP showed cholelithiasis without gallbladder wall thickening, pericholecystic free fluid, and no choledocholithiasis. Now s/p lap anthony on 1/19.    Plan  - Pain control: Tylenol 975mg PO q6h, oxy 5&10 PO q4h PRN  - Regular diet  - Monitor port sites for signs of bleeding or hematoma  - DVT ppx: subQ heparin

## 2019-01-20 NOTE — PROGRESS NOTE ADULT - SUBJECTIVE AND OBJECTIVE BOX
General Surgery Progress Note    SUBJECTIVE:  The patient was seen and examined. No acute events overnight. POD 1 s/p lap anthony for acute cholecystitis. Doing well post-operatively. [place] diet. Denies N/V, CP, SOB. Pain well controlled.    OBJECTIVE:     ** VITAL SIGNS / I&O's **    Vital Signs Last 24 Hrs  T(C): 36.5 (20 Jan 2019 09:10), Max: 37.2 (19 Jan 2019 15:17)  T(F): 97.7 (20 Jan 2019 09:10), Max: 98.9 (19 Jan 2019 15:17)  HR: 79 (20 Jan 2019 09:10) (63 - 90)  BP: 97/52 (20 Jan 2019 09:10) (97/52 - 139/82)  BP(mean): 74 (20 Jan 2019 03:15) (74 - 88)  RR: 20 (20 Jan 2019 09:10) (9 - 24)  SpO2: 95% (20 Jan 2019 09:10) (91% - 100%)      19 Jan 2019 07:01  -  20 Jan 2019 07:00  --------------------------------------------------------  IN:    IV PiggyBack: 50 mL    lactated ringers.: 500 mL    lactated ringers.: 500 mL  Total IN: 1050 mL    OUT:    Voided: 875 mL  Total OUT: 875 mL    Total NET: 175 mL      20 Jan 2019 07:01  -  20 Jan 2019 10:06  --------------------------------------------------------  IN:  Total IN: 0 mL    OUT:    Voided: 450 mL  Total OUT: 450 mL    Total NET: -450 mL          ** PHYSICAL EXAM **    -- CONSTITUTIONAL: Alert, NAD  -- PULMONARY: non-labored respirations  -- ABDOMEN: soft, non-distended, non-tender, dermabond over incision sites, c/d/i  -- NEURO: A&Ox3    ** LABS **                          13.8   6.54  )-----------( 260      ( 20 Jan 2019 07:15 )             41.4     20 Jan 2019 07:15    138    |  99     |  11     ----------------------------<  112    4.7     |  28     |  1.29     Ca    8.8        20 Jan 2019 07:15  Phos  4.8       20 Jan 2019 07:15  Mg     2.1       20 Jan 2019 07:15    TPro  6.0    /  Alb  3.4    /  TBili  0.6    /  DBili  0.2    /  AST  37     /  ALT  49     /  AlkPhos  67     20 Jan 2019 07:15    PT/INR - ( 19 Jan 2019 00:35 )   PT: 12.0 SEC;   INR: 1.05          PTT - ( 19 Jan 2019 00:35 )  PTT:33.7 SEC  CAPILLARY BLOOD GLUCOSE            LIVER FUNCTIONS - ( 20 Jan 2019 07:15 )  Alb: 3.4 g/dL / Pro: 6.0 g/dL / ALK PHOS: 67 u/L / ALT: 49 u/L / AST: 37 u/L / GGT: x                 MEDICATIONS  (STANDING):  acetaminophen   Tablet .. 975 milliGRAM(s) Oral every 6 hours  heparin  Injectable 5000 Unit(s) SubCutaneous every 8 hours  lactated ringers. 1000 milliLiter(s) (125 mL/Hr) IV Continuous <Continuous>    MEDICATIONS  (PRN):  acetaminophen  IVPB .. 1000 milliGRAM(s) IV Intermittent Once PRN Mild Pain (1 - 3), Moderate Pain (4 - 6), Severe Pain (7 - 10)  ondansetron Injectable 4 milliGRAM(s) IV Push every 6 hours PRN Nausea and/or Vomiting  oxyCODONE    IR 5 milliGRAM(s) Oral every 4 hours PRN Mild Pain (1 - 3)  oxyCODONE    IR 10 milliGRAM(s) Oral every 4 hours PRN Moderate Pain (4 - 6)

## 2019-01-20 NOTE — PROGRESS NOTE ADULT - SUBJECTIVE AND OBJECTIVE BOX
Surgery POST-OP CHECK NOTE:    Procedure: Laparoscopic cholecystectomy    Subjective: Resting comfortably in bed. Pain controlled. No N/V, CP, or SOB.    Vital Signs Last 24 Hrs  T(C): 37 (20 Jan 2019 03:15), Max: 37.2 (19 Jan 2019 15:17)  T(F): 98.6 (20 Jan 2019 03:15), Max: 98.9 (19 Jan 2019 15:17)  HR: 73 (20 Jan 2019 03:15) (62 - 90)  BP: 115/63 (20 Jan 2019 03:15) (105/82 - 141/77)  BP(mean): 74 (20 Jan 2019 03:15) (74 - 88)  RR: 24 (20 Jan 2019 03:15) (9 - 24)  SpO2: 100% (20 Jan 2019 03:15) (91% - 100%)  I&O's Summary    19 Jan 2019 07:01  -  20 Jan 2019 03:48  --------------------------------------------------------  IN: 550 mL / OUT: 325 mL / NET: 225 mL                            14.8   7.83  )-----------( 298      ( 19 Jan 2019 00:45 )             46.4     01-19    139  |  100  |  12  ----------------------------<  105<H>  4.2   |  27  |  1.36<H>    Ca    9.8      19 Jan 2019 00:40    TPro  6.8  /  Alb  4.0  /  TBili  0.7  /  DBili  x   /  AST  30  /  ALT  43<H>  /  AlkPhos  67  01-19   PT/INR - ( 19 Jan 2019 00:35 )   PT: 12.0 SEC;   INR: 1.05          PTT - ( 19 Jan 2019 00:35 )  PTT:33.7 SEC    PHYSICAL EXAM:  Gen: NAD, well-developed  Neuro: AAOX3, PERRL, CNII-XII grossly intact  CV: Pulse regularly present  Pulm: normal respiratory effort  GI: abd soft, appropriately tender, nondistended, port sites w/ dermabond  Ext: 2+ pulses throughout

## 2019-01-20 NOTE — DISCHARGE NOTE ADULT - CARE PLAN
Principal Discharge DX:	Abdominal pain Principal Discharge DX:	Abdominal pain  Goal:	Resolution of abd pain  Assessment and plan of treatment:	Laparoscopic cholecystectomy performed.   Please take pain medications as prescribed.   Please follow up with Dr. Mccarthy in 2 wks.

## 2019-01-20 NOTE — DISCHARGE NOTE ADULT - MEDICATION SUMMARY - MEDICATIONS TO TAKE
I will START or STAY ON the medications listed below when I get home from the hospital:    oxyCODONE 5 mg oral tablet  -- 1 tab(s) by mouth every 4 hours, As Needed -for moderate pain  - for severe pain MDD:6 tabs   -- Caution federal law prohibits the transfer of this drug to any person other  than the person for whom it was prescribed.  It is very important that you take or use this exactly as directed.  Do not skip doses or discontinue unless directed by your doctor.  May cause drowsiness.  Alcohol may intensify this effect.  Use care when operating dangerous machinery.  This prescription cannot be refilled.  Using more of this medication than prescribed may cause serious breathing problems.    -- Indication: For post-op pain    Valium 5 mg oral tablet  -- 1 tab(s) by mouth every 8 hours, As Needed - for muscle spasm  -- Caution federal law prohibits the transfer of this drug to any person other  than the person for whom it was prescribed.  Do not take this drug if you are pregnant.  May cause drowsiness.  Alcohol may intensify this effect.  Use care when operating dangerous machinery.    -- Indication: For muscle spasm    Tessalon Perles 100 mg oral capsule  -- 1 cap(s) by mouth every 8 hours, As Needed - for cough  -- May cause drowsiness.  Alcohol may intensify this effect.  Use care when operating dangerous machinery.  Swallow whole.  Do not crush.    -- Indication: For cough

## 2019-01-20 NOTE — DISCHARGE NOTE ADULT - PATIENT PORTAL LINK FT
You can access the [facility] Patient Portal, offered by [facility], by registering with the following website: [email or web address]

## 2019-01-20 NOTE — DISCHARGE NOTE ADULT - CONDITIONS AT DISCHARGE
Pt. A&Ox4, VS stable. Pt. pain adequately managed via orders. Pt. OOB as tolerated, standby. Pt. free from episodes of respiratory distress. Abd lap sites x4 c/d/i dermabond. Abd rounded, nontender, soft. Pt. tolerating regular diet w/o episodes of GI distress, nausea, vomiting. Voiding adequately. Patient discharged to home. Discharge instructions given and understood. IV removed. Patient left w/ family and belongings.

## 2019-01-20 NOTE — PROGRESS NOTE ADULT - ASSESSMENT
A: 44 yo M POD 1 s/p lap anthony for acute cholecystitis.     P:  - Cont reg diet  - Pain control  - OOB/IS  - DVT ppx  - D/c today    Marcia Ivey, PGY-1  [facility] General Surgery- B Team  [id number]

## 2019-01-20 NOTE — DISCHARGE NOTE ADULT - ADDITIONAL INSTRUCTIONS
WOUND CARE: Please keep incisions clean and dry. Please do not scrub or rub incisions. Please to do not apply lotion or powder on incisions.   BATHING: Please do not submerge wound underwater. You may shower and/or sponge bathe.  ACTIVITY: No heavy lifting or straining. Otherwise, you may return to your usual level of physical activity. If you are taking narcotic pain medication (such as Percocet), do NOT drive a car, operate machinery or make important decisions.    If you experience constipation due to taking opioids such as oxycodone, then you may take stool softeners such as miralax over the counter.     DIET: Return to your usual low fat diet.    NOTIFY YOUR SURGEON IF: You have any bleeding that does not stop, any pus draining from your wound, any fever (over 100.4 F) or chills, persistent nausea/vomiting, persistent diarrhea, or if your pain is not controlled on your discharge pain medications.  FOLLOW-UP:  1. Please call Dr Mccarthy's office to make a follow-up appointment within two weeks of discharge WOUND CARE: Please keep incisions clean and dry. Please do not scrub or rub incisions. Please to do not apply lotion or powder on incisions.   BATHING: Please do not submerge wound underwater. You may shower and/or sponge bathe.  ACTIVITY: No heavy lifting or straining. Otherwise, you may return to your usual level of physical activity. If you are taking narcotic pain medication (such as Percocet), do NOT drive a car, operate machinery or make important decisions.    If you experience constipation due to taking opioids such as oxycodone, then you may take stool softeners such as miralax over the counter.     DIET: Return to your usual low fat diet.    NOTIFY YOUR SURGEON IF: You have any bleeding that does not stop, any pus draining from your wound, any fever (over 100.4 F) or chills, persistent nausea/vomiting, persistent diarrhea, or if your pain is not controlled on your discharge pain medications.  FOLLOW-UP:  1. Please call Dr Mccarthy's office [phone] to make a follow-up appointment within two weeks of discharge.

## 2019-01-30 ENCOUNTER — APPOINTMENT (OUTPATIENT)
Dept: SURGERY | Facility: CLINIC | Age: 44
End: 2019-01-30
Payer: COMMERCIAL

## 2019-01-30 VITALS
BODY MASS INDEX: 36.15 KG/M2 | HEIGHT: 65 IN | SYSTOLIC BLOOD PRESSURE: 134 MMHG | HEART RATE: 83 BPM | DIASTOLIC BLOOD PRESSURE: 84 MMHG | WEIGHT: 217 LBS | TEMPERATURE: 98.4 F

## 2019-01-30 PROCEDURE — 99024 POSTOP FOLLOW-UP VISIT: CPT

## 2019-01-30 NOTE — PHYSICAL EXAM
[de-identified] : Well, comfortable [de-identified] : Soft, nontender, nondistended. The incisions are healed well without signs of erythema, cellulitis or drainage. No palpable hernia formation.\par

## 2019-01-30 NOTE — HISTORY OF PRESENT ILLNESS
[de-identified] : Pt has recovered well and denies fevers, chills, abdominal pain, SOB/dyspnea or weakness/fatigue. Tolerating adequate PO intake and GI activity (bowel movements) has recovered without constipation. He does note slight diarrhea. No complaints regarding incisions and dressings. He notes night sweats which he had prior to surgery. \par

## 2019-01-30 NOTE — PLAN
[FreeTextEntry1] : Mr. Burns underwent a laparoscopic cholecystectomy and has recovered well. The incisions are well healed and good PO nutrition is being tolerated. the pathology revealed no evidence of malignancy. \par \par Resume normal activity with gradual resumption of strenuous activity\par Avoid fatty foods. Low fat diet since diarrhea has developed. This should improve with time. (weeks)\par Follow up with me in clinic if yellowing of skin develops or fever/chills and RUQ pain develops. \par If night sweats continue, I advised the patient he should follow up with his PMD\par \par I spent 15min reviewing data, images and information. Greater than 50% of my time was spent in face to face discussion regarding wound healing, postoperative diet and activity.\par \par Ronnie Mccarthy MD\par Acute Care Surgery\par

## 2021-06-04 ENCOUNTER — INPATIENT (INPATIENT)
Facility: HOSPITAL | Age: 46
LOS: 2 days | Discharge: HOME | End: 2021-06-07
Attending: INTERNAL MEDICINE | Admitting: INTERNAL MEDICINE
Payer: COMMERCIAL

## 2021-06-04 VITALS
HEIGHT: 66 IN | SYSTOLIC BLOOD PRESSURE: 116 MMHG | RESPIRATION RATE: 19 BRPM | TEMPERATURE: 98 F | DIASTOLIC BLOOD PRESSURE: 73 MMHG | HEART RATE: 134 BPM | OXYGEN SATURATION: 97 % | WEIGHT: 220.02 LBS

## 2021-06-04 LAB
ALBUMIN SERPL ELPH-MCNC: 2.7 G/DL — LOW (ref 3.3–5)
ALP SERPL-CCNC: 55 U/L — SIGNIFICANT CHANGE UP (ref 40–120)
ALT FLD-CCNC: 45 U/L — SIGNIFICANT CHANGE UP (ref 12–78)
ANION GAP SERPL CALC-SCNC: 6 MMOL/L — SIGNIFICANT CHANGE UP (ref 5–17)
AST SERPL-CCNC: 20 U/L — SIGNIFICANT CHANGE UP (ref 15–37)
BASOPHILS # BLD AUTO: 0.04 K/UL — SIGNIFICANT CHANGE UP (ref 0–0.2)
BASOPHILS NFR BLD AUTO: 0.3 % — SIGNIFICANT CHANGE UP (ref 0–2)
BILIRUB SERPL-MCNC: 0.4 MG/DL — SIGNIFICANT CHANGE UP (ref 0.2–1.2)
BLD GP AB SCN SERPL QL: SIGNIFICANT CHANGE UP
BUN SERPL-MCNC: 36 MG/DL — HIGH (ref 7–23)
CALCIUM SERPL-MCNC: 7.9 MG/DL — LOW (ref 8.5–10.1)
CHLORIDE SERPL-SCNC: 109 MMOL/L — HIGH (ref 96–108)
CO2 SERPL-SCNC: 28 MMOL/L — SIGNIFICANT CHANGE UP (ref 22–31)
CREAT SERPL-MCNC: 1.28 MG/DL — SIGNIFICANT CHANGE UP (ref 0.5–1.3)
EOSINOPHIL # BLD AUTO: 0.06 K/UL — SIGNIFICANT CHANGE UP (ref 0–0.5)
EOSINOPHIL NFR BLD AUTO: 0.5 % — SIGNIFICANT CHANGE UP (ref 0–6)
FLUAV AG NPH QL: SIGNIFICANT CHANGE UP
FLUBV AG NPH QL: SIGNIFICANT CHANGE UP
GLUCOSE BLDC GLUCOMTR-MCNC: 156 MG/DL — HIGH (ref 70–99)
GLUCOSE SERPL-MCNC: 122 MG/DL — HIGH (ref 70–99)
HCT VFR BLD CALC: 36.5 % — LOW (ref 39–50)
HCT VFR BLD CALC: 41.6 % — SIGNIFICANT CHANGE UP (ref 39–50)
HGB BLD-MCNC: 12.1 G/DL — LOW (ref 13–17)
HGB BLD-MCNC: 13.7 G/DL — SIGNIFICANT CHANGE UP (ref 13–17)
IMM GRANULOCYTES NFR BLD AUTO: 0.6 % — SIGNIFICANT CHANGE UP (ref 0–1.5)
LIDOCAIN IGE QN: 84 U/L — SIGNIFICANT CHANGE UP (ref 73–393)
LYMPHOCYTES # BLD AUTO: 1.64 K/UL — SIGNIFICANT CHANGE UP (ref 1–3.3)
LYMPHOCYTES # BLD AUTO: 13.2 % — SIGNIFICANT CHANGE UP (ref 13–44)
MCHC RBC-ENTMCNC: 30.6 PG — SIGNIFICANT CHANGE UP (ref 27–34)
MCHC RBC-ENTMCNC: 30.8 PG — SIGNIFICANT CHANGE UP (ref 27–34)
MCHC RBC-ENTMCNC: 32.9 GM/DL — SIGNIFICANT CHANGE UP (ref 32–36)
MCHC RBC-ENTMCNC: 33.2 GM/DL — SIGNIFICANT CHANGE UP (ref 32–36)
MCV RBC AUTO: 92.4 FL — SIGNIFICANT CHANGE UP (ref 80–100)
MCV RBC AUTO: 93.5 FL — SIGNIFICANT CHANGE UP (ref 80–100)
MONOCYTES # BLD AUTO: 1.27 K/UL — HIGH (ref 0–0.9)
MONOCYTES NFR BLD AUTO: 10.3 % — SIGNIFICANT CHANGE UP (ref 2–14)
NEUTROPHILS # BLD AUTO: 9.29 K/UL — HIGH (ref 1.8–7.4)
NEUTROPHILS NFR BLD AUTO: 75.1 % — SIGNIFICANT CHANGE UP (ref 43–77)
NRBC # BLD: 0 /100 WBCS — SIGNIFICANT CHANGE UP (ref 0–0)
NRBC # BLD: 0 /100 WBCS — SIGNIFICANT CHANGE UP (ref 0–0)
OB PNL STL: POSITIVE
PLATELET # BLD AUTO: 237 K/UL — SIGNIFICANT CHANGE UP (ref 150–400)
PLATELET # BLD AUTO: 291 K/UL — SIGNIFICANT CHANGE UP (ref 150–400)
POTASSIUM SERPL-MCNC: 4.2 MMOL/L — SIGNIFICANT CHANGE UP (ref 3.5–5.3)
POTASSIUM SERPL-SCNC: 4.2 MMOL/L — SIGNIFICANT CHANGE UP (ref 3.5–5.3)
PROT SERPL-MCNC: 5.7 GM/DL — LOW (ref 6–8.3)
RBC # BLD: 3.95 M/UL — LOW (ref 4.2–5.8)
RBC # BLD: 4.45 M/UL — SIGNIFICANT CHANGE UP (ref 4.2–5.8)
RBC # FLD: 12.7 % — SIGNIFICANT CHANGE UP (ref 10.3–14.5)
RBC # FLD: 12.7 % — SIGNIFICANT CHANGE UP (ref 10.3–14.5)
SARS-COV-2 RNA SPEC QL NAA+PROBE: SIGNIFICANT CHANGE UP
SODIUM SERPL-SCNC: 143 MMOL/L — SIGNIFICANT CHANGE UP (ref 135–145)
TROPONIN I SERPL-MCNC: <.015 NG/ML — SIGNIFICANT CHANGE UP (ref 0.01–0.04)
WBC # BLD: 10.17 K/UL — SIGNIFICANT CHANGE UP (ref 3.8–10.5)
WBC # BLD: 12.38 K/UL — HIGH (ref 3.8–10.5)
WBC # FLD AUTO: 10.17 K/UL — SIGNIFICANT CHANGE UP (ref 3.8–10.5)
WBC # FLD AUTO: 12.38 K/UL — HIGH (ref 3.8–10.5)

## 2021-06-04 PROCEDURE — 99285 EMERGENCY DEPT VISIT HI MDM: CPT

## 2021-06-04 PROCEDURE — 93010 ELECTROCARDIOGRAM REPORT: CPT

## 2021-06-04 PROCEDURE — 99222 1ST HOSP IP/OBS MODERATE 55: CPT

## 2021-06-04 PROCEDURE — 71045 X-RAY EXAM CHEST 1 VIEW: CPT | Mod: 26

## 2021-06-04 RX ORDER — PANTOPRAZOLE SODIUM 20 MG/1
80 TABLET, DELAYED RELEASE ORAL ONCE
Refills: 0 | Status: COMPLETED | OUTPATIENT
Start: 2021-06-04 | End: 2021-06-04

## 2021-06-04 RX ORDER — PANTOPRAZOLE SODIUM 20 MG/1
8 TABLET, DELAYED RELEASE ORAL
Qty: 80 | Refills: 0 | Status: DISCONTINUED | OUTPATIENT
Start: 2021-06-04 | End: 2021-06-07

## 2021-06-04 RX ORDER — ACETAMINOPHEN 500 MG
650 TABLET ORAL EVERY 6 HOURS
Refills: 0 | Status: DISCONTINUED | OUTPATIENT
Start: 2021-06-04 | End: 2021-06-07

## 2021-06-04 RX ORDER — PANTOPRAZOLE SODIUM 20 MG/1
40 TABLET, DELAYED RELEASE ORAL ONCE
Refills: 0 | Status: COMPLETED | OUTPATIENT
Start: 2021-06-04 | End: 2021-06-04

## 2021-06-04 RX ORDER — SODIUM CHLORIDE 9 MG/ML
1000 INJECTION INTRAMUSCULAR; INTRAVENOUS; SUBCUTANEOUS ONCE
Refills: 0 | Status: COMPLETED | OUTPATIENT
Start: 2021-06-04 | End: 2021-06-04

## 2021-06-04 RX ADMIN — SODIUM CHLORIDE 1000 MILLILITER(S): 9 INJECTION INTRAMUSCULAR; INTRAVENOUS; SUBCUTANEOUS at 20:15

## 2021-06-04 RX ADMIN — SODIUM CHLORIDE 1000 MILLILITER(S): 9 INJECTION INTRAMUSCULAR; INTRAVENOUS; SUBCUTANEOUS at 17:10

## 2021-06-04 RX ADMIN — PANTOPRAZOLE SODIUM 40 MILLIGRAM(S): 20 TABLET, DELAYED RELEASE ORAL at 17:12

## 2021-06-04 RX ADMIN — PANTOPRAZOLE SODIUM 80 MILLIGRAM(S): 20 TABLET, DELAYED RELEASE ORAL at 21:37

## 2021-06-04 RX ADMIN — PANTOPRAZOLE SODIUM 10 MG/HR: 20 TABLET, DELAYED RELEASE ORAL at 21:37

## 2021-06-04 NOTE — H&P ADULT - NSHPPHYSICALEXAM_GEN_ALL_CORE
Vital Signs Last 24 Hrs  T(C): 37.2 (04 Jun 2021 19:19), Max: 37.2 (04 Jun 2021 19:19)  T(F): 98.9 (04 Jun 2021 19:19), Max: 98.9 (04 Jun 2021 19:19)  HR: 119 (04 Jun 2021 19:19) (119 - 134)  BP: 123/69 (04 Jun 2021 19:19) (116/73 - 123/69)  BP(mean): --  RR: 20 (04 Jun 2021 19:19) (19 - 20)  SpO2: 96% (04 Jun 2021 19:19) (96% - 97%)      Telemedicine precludes detailed physical examination  pt states feels improved compared to prior Physical exam:  General: patient in no acute distress, resting comfortably  Head:  Atraumatic, Normocephalic  Eyes: EOMI, PERRLA, clear sclera  Neck: Supple, thyroid nontender, non enlarged  Cardio: S1/S2 +ve, regular rate and rhythm, no M/G/R  Resp: clear to ausculation bilaterally, no rales or wheezes  GI: abdomen soft, nontender, non distended, no guarding, BS +ve x 4  Ext: no significant pedal edema  Neuro: CN 2-12 intact, no significant motor or sensory deficits.  Skin: No rashes or lesions

## 2021-06-04 NOTE — ED PROVIDER NOTE - OBJECTIVE STATEMENT
46m pmhx pud s/p treatment 3 years ago presents with syncopal episode today while defecating. for the past 3 days he has felt epigastric discomfort/burning consistent with the symptoms he felt when he had PUD 3 years ago. 2 black BMs this AM. no chest pain or sob. feels weak. did not strike head when he fell.

## 2021-06-04 NOTE — ED ADULT TRIAGE NOTE - CHIEF COMPLAINT QUOTE
patient BIBA c/o of L arm pain , patient had an syncope episode today fall woke up with face down , denied  chest pain , denied headache denied dizziness moving all extremities no facial droop clear speech at the time of triage

## 2021-06-04 NOTE — H&P ADULT - ASSESSMENT
46 year old male w hx gastric PUD came to ED by EMS w syncope, melena, blood per rectum      #Acute UGI bleed         in pt w hx gastric PUD, FOBT+  #Syncope  #Tachycardia  1. admit to telemetry  2. serial CBC  3. fall risk  4. orthostatic BP  5. trop in AM  6. clears po  7. NPO after MN  8 s/p NS 2 L in ED  9. s/p protonix 40 mg IV  10. protonix gtt  11. T+S  12. trop, EKG in AM  13 pt instructed to inform staff if increased pain, sweating, melena, blood per rectum, increased weakness      #VTE  IMPROVE VTE Individual Risk Assessment    RISK                                                                Points    [  ] Previous VTE                                                  3    [  ] Thrombophilia                                               2    [  ] Lower limb paralysis                                      2        (unable to hold up >15 seconds)      [  ] Current Cancer                                              2         (within 6 months)    [  ] Immobilization > 24 hrs                                1    [  ] ICU/CCU stay > 24 hours                              1    [  ] Age > 60                                                      1    IMPROVE VTE Score _____0____    IMPROVE Score 0-1: Low Risk, No VTE prophylaxis required for most patients, encourage ambulation.   IMPROVE Score 2-3: At risk, pharmacologic VTE prophylaxis is indicated for most patients (in the absence of a contraindication)  IMPROVE Score > or = 4: High Risk, pharmacologic VTE prophylaxis is indicated for most patients (in the absence of a contraindication)      VTE SCD given active bleeding  med rec        FOLLOW UP  EKG as I cannot view  GI consult      sign out to Dr. Pham

## 2021-06-04 NOTE — ED PROVIDER NOTE - GASTROINTESTINAL, MLM
Abdomen soft, non-tender, no guarding. rectal exam with melanotic appearing stool, no active bleeding or excessive melena output

## 2021-06-04 NOTE — ED ADULT NURSE NOTE - OBJECTIVE STATEMENT
47 y/o M came to the ed after having a syncopal episode during a BM. Stool came black and tarry this morning.  45 y/o M came to the ed after having a syncopal episode during a BM. Stool came black and tarry this morning.  at triage. Pt has no past medical history and denies being on any blood thinners

## 2021-06-04 NOTE — ED ADULT NURSE NOTE - ED STAT RN HANDOFF DETAILS
report given to stanislav lamar.. Safety checks compld this shift/Safety rounds completed hourly.  IV sites checked Q2+remains WDL. Meds given as ord with no s/s of adverse RXNs. Fall +skin precs in place. Any issues endorsed to oncoming RN for follow up. RN made aware of HR. no complaints from pt. pt denies pain at this time, on cardiac monitor. no acute distress noted, will continue to monitor

## 2021-06-04 NOTE — H&P ADULT - HISTORY OF PRESENT ILLNESS
In ED /73      RR 19   T 98.2   97% sat RA  FOBT+  NS 1000 cc x 2 = 2000 cc  protonix 40 mg  H 13.7 then 12.1  CXR no acute infiltrate, heart size normal        PAST MEDICAL HX  PUD peptic ulcer disease    PAST SURGICAL HX  Laparoscopic cholecystectomy 01/20/2019       FAMILY HX  No pertinent family history in first degree relatives      SOCIAL HX  Nonsmoker  works as  for [employer]   46 year old male w hx gastric PUD came to ED by EMS w syncope, melena, blood per rectum      Over the past week pt developed fatigue  Tuesday 06-01 fled epigastric pain like ulcer was flaring up  +dark blood per rectum last night and today w melena  +syncope while defecating after 2nd BM today w blood and melena  wife called 911  +pain epigastric area w/o radiation  +diaphoresis  +felt hot today but had a normal temp when he checked  has had some back pain recently  does take ASA 81 mg qd as per PCP    Felt the same epigastric pain he had when he was admitted to [facility] 2019 when he had acute cholecystitis and had lap anthony  Has had EGD in past and was told of a healed gastric peptic ulcer but did not follow up afterwards.  Does not know if he had H. pylori      In ED /73      RR 19   T 98.2   97% sat RA  FOBT+  NS 1000 cc x 2 = 2000 cc  protonix 40 mg  H 13.7 then 12.1  CXR no acute infiltrate, heart size normal          PAST MEDICAL HX  PUD gastric peptic ulcer disease    PAST SURGICAL HX  EGD esophagogastroduodenoscopy  Laparoscopic cholecystectomy 01/20/2019       FAMILY HX  No pertinent family history in first degree relatives of GI disease, PUD      SOCIAL HX  Nonsmoker  works as  for [employer]   46 year old male w hx gastric PUD came to ED by EMS w syncope, melena, blood per rectum      Over the past week pt developed fatigue  Tuesday 06-01 fled epigastric pain like ulcer was flaring up  +dark blood per rectum last night and today w melena  +syncope while defecating after 2nd BM today w blood and melena  wife called 911  +pain epigastric area w/o radiation  +diaphoresis  +felt hot today but had a normal temp when he checked  has had some back pain recently  does take ASA 81 mg qd as per PCP    Felt the same epigastric pain he had when he was admitted to [facility] 2019 when he had acute cholecystitis and had lap anthony  Has had EGD in past and was told of a healed gastric peptic ulcer but did not follow up afterwards.  Does not know if he had H. pylori      In ED /73      RR 19   T 98.2   97% sat RA  FOBT+  NS 1000 cc x 2 = 2000 cc  protonix 40 mg  H 13.7 then 12.1  CXR no acute infiltrate, heart size normal          PAST MEDICAL HX  PUD gastric peptic ulcer disease    PAST SURGICAL HX  EGD esophagogastroduodenoscopy  Laparoscopic cholecystectomy 01/20/2019       FAMILY HX  No pertinent family history in first degree relatives of GI disease, PUD      SOCIAL HX  Nonsmoker  works as  for [employer]        IMMUNIZATION  Moderna x 2 for COVID-19 APril 2021

## 2021-06-04 NOTE — H&P ADULT - NSHPREVIEWOFSYSTEMS_GEN_ALL_CORE
Constitutional: no fever, chills, night sweats  Ears: no hearing changes or ear pain,   Nose: no nasal congestion, sinus pain, or rhinorrhea  Cardio: no chest pain, orthopnea, edema, or palpitations  Resp: no dyspnea, cough, wheezing  GI: + rectal bleeding  : no dysuria, urinary frequency, hematuria  MSK: no back pain, neck pain  Skin: no rash, pruritis   Neuro: + syncope, weakness, fatigue    Heme/Lymph: no bruising or bleeding

## 2021-06-04 NOTE — ED PROVIDER NOTE - CLINICAL SUMMARY MEDICAL DECISION MAKING FREE TEXT BOX
patient with likely syncopal episode 2/2 GI blood losses. no chest pain. neurologically itnact. tachcyardic with normal BP in ED. IVF resuscitation adminstered. hgb 13.7; will trend. no evidence of large volume active hemmorhage on exam. ppi administered. will require admission for GI eval, H/H trending as patient displaying signs of symptomatic anemia 2/2 gi bleeding

## 2021-06-05 LAB
ANION GAP SERPL CALC-SCNC: 2 MMOL/L — LOW (ref 5–17)
BUN SERPL-MCNC: 25 MG/DL — HIGH (ref 7–23)
CALCIUM SERPL-MCNC: 7.9 MG/DL — LOW (ref 8.5–10.1)
CHLORIDE SERPL-SCNC: 112 MMOL/L — HIGH (ref 96–108)
CO2 SERPL-SCNC: 29 MMOL/L — SIGNIFICANT CHANGE UP (ref 22–31)
COVID-19 SPIKE DOMAIN AB INTERP: POSITIVE
COVID-19 SPIKE DOMAIN ANTIBODY RESULT: >250 U/ML — HIGH
CREAT SERPL-MCNC: 0.97 MG/DL — SIGNIFICANT CHANGE UP (ref 0.5–1.3)
GLUCOSE SERPL-MCNC: 84 MG/DL — SIGNIFICANT CHANGE UP (ref 70–99)
HCT VFR BLD CALC: 34.6 % — LOW (ref 39–50)
HCT VFR BLD CALC: 35.4 % — LOW (ref 39–50)
HCT VFR BLD CALC: 35.9 % — LOW (ref 39–50)
HCT VFR BLD CALC: 37 % — LOW (ref 39–50)
HGB BLD-MCNC: 11.4 G/DL — LOW (ref 13–17)
HGB BLD-MCNC: 11.5 G/DL — LOW (ref 13–17)
HGB BLD-MCNC: 11.9 G/DL — LOW (ref 13–17)
HGB BLD-MCNC: 12.2 G/DL — LOW (ref 13–17)
LACTATE SERPL-SCNC: 1.1 MMOL/L — SIGNIFICANT CHANGE UP (ref 0.7–2)
MAGNESIUM SERPL-MCNC: 2.4 MG/DL — SIGNIFICANT CHANGE UP (ref 1.6–2.6)
MCHC RBC-ENTMCNC: 30.6 PG — SIGNIFICANT CHANGE UP (ref 27–34)
MCHC RBC-ENTMCNC: 30.6 PG — SIGNIFICANT CHANGE UP (ref 27–34)
MCHC RBC-ENTMCNC: 30.7 PG — SIGNIFICANT CHANGE UP (ref 27–34)
MCHC RBC-ENTMCNC: 30.8 PG — SIGNIFICANT CHANGE UP (ref 27–34)
MCHC RBC-ENTMCNC: 32.2 GM/DL — SIGNIFICANT CHANGE UP (ref 32–36)
MCHC RBC-ENTMCNC: 33 GM/DL — SIGNIFICANT CHANGE UP (ref 32–36)
MCHC RBC-ENTMCNC: 33.1 GM/DL — SIGNIFICANT CHANGE UP (ref 32–36)
MCHC RBC-ENTMCNC: 33.2 GM/DL — SIGNIFICANT CHANGE UP (ref 32–36)
MCV RBC AUTO: 92.7 FL — SIGNIFICANT CHANGE UP (ref 80–100)
MCV RBC AUTO: 92.8 FL — SIGNIFICANT CHANGE UP (ref 80–100)
MCV RBC AUTO: 92.8 FL — SIGNIFICANT CHANGE UP (ref 80–100)
MCV RBC AUTO: 95.2 FL — SIGNIFICANT CHANGE UP (ref 80–100)
NRBC # BLD: 0 /100 WBCS — SIGNIFICANT CHANGE UP (ref 0–0)
PHOSPHATE SERPL-MCNC: 2.8 MG/DL — SIGNIFICANT CHANGE UP (ref 2.5–4.5)
PLATELET # BLD AUTO: 182 K/UL — SIGNIFICANT CHANGE UP (ref 150–400)
PLATELET # BLD AUTO: 224 K/UL — SIGNIFICANT CHANGE UP (ref 150–400)
PLATELET # BLD AUTO: 229 K/UL — SIGNIFICANT CHANGE UP (ref 150–400)
PLATELET # BLD AUTO: 232 K/UL — SIGNIFICANT CHANGE UP (ref 150–400)
POTASSIUM SERPL-MCNC: 4.2 MMOL/L — SIGNIFICANT CHANGE UP (ref 3.5–5.3)
POTASSIUM SERPL-SCNC: 4.2 MMOL/L — SIGNIFICANT CHANGE UP (ref 3.5–5.3)
RBC # BLD: 3.72 M/UL — LOW (ref 4.2–5.8)
RBC # BLD: 3.73 M/UL — LOW (ref 4.2–5.8)
RBC # BLD: 3.87 M/UL — LOW (ref 4.2–5.8)
RBC # BLD: 3.99 M/UL — LOW (ref 4.2–5.8)
RBC # FLD: 12.8 % — SIGNIFICANT CHANGE UP (ref 10.3–14.5)
RBC # FLD: 13 % — SIGNIFICANT CHANGE UP (ref 10.3–14.5)
RBC # FLD: 13 % — SIGNIFICANT CHANGE UP (ref 10.3–14.5)
RBC # FLD: 13.1 % — SIGNIFICANT CHANGE UP (ref 10.3–14.5)
SARS-COV-2 IGG+IGM SERPL QL IA: >250 U/ML — HIGH
SARS-COV-2 IGG+IGM SERPL QL IA: POSITIVE
SODIUM SERPL-SCNC: 143 MMOL/L — SIGNIFICANT CHANGE UP (ref 135–145)
TROPONIN I SERPL-MCNC: <.015 NG/ML — SIGNIFICANT CHANGE UP (ref 0.01–0.04)
WBC # BLD: 10.47 K/UL — SIGNIFICANT CHANGE UP (ref 3.8–10.5)
WBC # BLD: 7.14 K/UL — SIGNIFICANT CHANGE UP (ref 3.8–10.5)
WBC # BLD: 7.83 K/UL — SIGNIFICANT CHANGE UP (ref 3.8–10.5)
WBC # BLD: 8.35 K/UL — SIGNIFICANT CHANGE UP (ref 3.8–10.5)
WBC # FLD AUTO: 10.47 K/UL — SIGNIFICANT CHANGE UP (ref 3.8–10.5)
WBC # FLD AUTO: 7.14 K/UL — SIGNIFICANT CHANGE UP (ref 3.8–10.5)
WBC # FLD AUTO: 7.83 K/UL — SIGNIFICANT CHANGE UP (ref 3.8–10.5)
WBC # FLD AUTO: 8.35 K/UL — SIGNIFICANT CHANGE UP (ref 3.8–10.5)

## 2021-06-05 PROCEDURE — 99233 SBSQ HOSP IP/OBS HIGH 50: CPT

## 2021-06-05 RX ORDER — SODIUM CHLORIDE 9 MG/ML
1000 INJECTION, SOLUTION INTRAVENOUS
Refills: 0 | Status: DISCONTINUED | OUTPATIENT
Start: 2021-06-05 | End: 2021-06-06

## 2021-06-05 RX ORDER — HYDROCORTISONE 1 %
1 OINTMENT (GRAM) TOPICAL DAILY
Refills: 0 | Status: DISCONTINUED | OUTPATIENT
Start: 2021-06-05 | End: 2021-06-07

## 2021-06-05 RX ADMIN — PANTOPRAZOLE SODIUM 10 MG/HR: 20 TABLET, DELAYED RELEASE ORAL at 05:48

## 2021-06-05 RX ADMIN — Medication 1 SUPPOSITORY(S): at 13:35

## 2021-06-05 NOTE — CONSULT NOTE ADULT - SUBJECTIVE AND OBJECTIVE BOX
HPI:  46 year old male w hx gastric PUD came to ED by EMS w syncope, melena, blood per rectum  ----------------- As Above --------------  Patient had been in his USOH until Tuesday when he developed sandie umbilical pain best described as sharp. Caused decreased appetite but food resolved the pain. The pain lasted ~ 2 - 3 days. He started seeing dark brown stool Wednesday. Initially, it was tarry. Patient on ASA 81 QD. N/V  Patient had one episode of the same symptoms in 2019. He had an EGD which revealed healed PUD and subsequently had a cholecystectomy.   Last dark brown stool was 2 PM today.   Never had a colonoscopy  Patient  states that the ER looked at his stools and said that it was blood     Over the past week pt developed fatigue  Tuesday 06-01 fled epigastric pain like ulcer was flaring up  +dark blood per rectum last night and today w melena  +syncope while defecating after 2nd BM today w blood and melena  wife called 911  +pain epigastric area w/o radiation  +diaphoresis  +felt hot today but had a normal temp when he checked  has had some back pain recently  does take ASA 81 mg qd as per PCP    Felt the same epigastric pain he had when he was admitted to [facility] 2019 when he had acute cholecystitis and had lap anthony  Has had EGD in past and was told of a healed gastric peptic ulcer but did not follow up afterwards.  Does not know if he had H. pylori      In ED /73      RR 19   T 98.2   97% sat RA  FOBT+  NS 1000 cc x 2 = 2000 cc  protonix 40 mg  H 13.7 then 12.1  CXR no acute infiltrate, heart size normal          PAST MEDICAL HX  PUD gastric peptic ulcer disease    PAST SURGICAL HX  EGD esophagogastroduodenoscopy  Laparoscopic cholecystectomy 01/20/2019       FAMILY HX  No pertinent family history in first degree relatives of GI disease, PUD      SOCIAL HX  Nonsmoker  works as  for [employer]        IMMUNIZATION  Moderna x 2 for COVID-19 APril 2021   (04 Jun 2021 20:48)      PAST MEDICAL & SURGICAL HISTORY:  No pertinent past medical history    No pertinent past medical history    No significant past surgical history    No significant past surgical history        MEDICATIONS  (STANDING):  hydrocortisone hemorrhoidal Suppository 1 Suppository(s) Rectal daily  pantoprazole Infusion 8 mG/Hr (10 mL/Hr) IV Continuous <Continuous>    MEDICATIONS  (PRN):  acetaminophen   Tablet .. 650 milliGRAM(s) Oral every 6 hours PRN Temp greater or equal to 38C (100.4F), Mild Pain (1 - 3)      Allergies    No Known Drug Allergies  Seafood (Swelling)  shellfish (Swelling)    Intolerances        FAMILY HISTORY:  No pertinent family history in first degree relatives        REVIEW OF SYSTEMS:    CONSTITUTIONAL: No fever, weight loss,   EYES: No eye pain, visual disturbances, or discharge  ENMT:  No difficulty hearing, tinnitus, vertigo; No sinus or throat pain  NECK: No pain or stiffness  BREASTS: No pain, masses, or nipple discharge  RESPIRATORY: No cough, wheezing, chills or hemoptysis; No shortness of breath  CARDIOVASCULAR: No chest pain, palpitations, dizziness, or leg swelling  GASTROINTESTINAL:  see above   GENITOURINARY: No dysuria, frequency, hematuria, or incontinence  NEUROLOGICAL: No headaches, memory loss, loss of strength, numbness, or tremors  SKIN: No itching, burning, rashes, or lesions   LYMPH NODES: No enlarged glands  ENDOCRINE: No heat or cold intolerance; No hair loss  MUSCULOSKELETAL: No joint pain or swelling; No muscle, back, or extremity pain  PSYCHIATRIC: No depression, anxiety, mood swings, or difficulty sleeping  HEME/LYMPH: No easy bruising, or bleeding gums  ALLERGY AND IMMUNOLOGIC: No hives or eczema          SOCIAL HISTORY:    FAMILY HISTORY:  No pertinent family history in first degree relatives        Vital Signs Last 24 Hrs  T(C): 36.8 (05 Jun 2021 10:48), Max: 37.2 (04 Jun 2021 19:19)  T(F): 98.2 (05 Jun 2021 10:48), Max: 98.9 (04 Jun 2021 19:19)  HR: 94 (05 Jun 2021 10:48) (88 - 119)  BP: 105/70 (05 Jun 2021 10:48) (105/70 - 129/84)  BP(mean): --  RR: 18 (05 Jun 2021 10:48) (18 - 20)  SpO2: 100% (05 Jun 2021 10:48) (96% - 100%)    PHYSICAL EXAM:    GENERAL: NAD, well-groomed, well-developed  HEAD:  Atraumatic, Normocephalic  EYES: EOMI, PERRLA, conjunctiva and sclera clear  ENMT: No tonsillar erythema, exudates, or enlargement; Moist mucous membranes, Good dentition, No lesions  NECK: Supple, No JVD, Normal thyroid  NERVOUS SYSTEM:  Alert & Oriented X3, Good concentration;  CHEST/LUNG: Clear to percussion bilaterally; No rales, rhonchi, wheezing, or rubs  HEART: Regular rate and rhythm; No murmurs, rubs, or gallops  ABDOMEN: Soft, Nontender, Nondistended; Bowel sounds present  EXTREMITIES:  2+ Peripheral Pulses, No clubbing, cyanosis, or edema  LYMPH: No lymphadenopathy noted   RECTAL:  See above   SKIN: No rashes or lesions    LABS:                        11.9   7.83  )-----------( 232      ( 05 Jun 2021 12:41 )             35.9       CBC:  06-05 @ 12:41  WBC  7.83  HGB 11.9  HCT 35.9 Plate 232  MCV 92.8  06-05 @ 09:02  WBC  7.14  HGB 12.2  HCT 37.0 Plate 182  MCV 92.7  06-04 @ 23:59  WBC  10.47  HGB 11.4  HCT 35.4 Plate 229  MCV 95.2  06-04 @ 19:22  WBC  10.17  HGB 12.1  HCT 36.5 Plate 237  MCV 92.4  06-04 @ 17:14  WBC  12.38  HGB 13.7  HCT 41.6 Plate 291  MCV 93.5           05 Jun 2021 09:02    143    |  112    |  25     ----------------------------<  84     4.2     |  29     |  0.97   04 Jun 2021 17:14    143    |  109    |  36     ----------------------------<  122    4.2     |  28     |  1.28     Ca    7.9        05 Jun 2021 09:02  Ca    7.9        04 Jun 2021 17:14  Phos  2.8       05 Jun 2021 09:02  Mg     2.4       05 Jun 2021 09:02    TPro  5.7    /  Alb  2.7    /  TBili  0.4    /  DBili  x      /  AST  20     /  ALT  45     /  AlkPhos  55     04 Jun 2021 17:14            RADIOLOGY & ADDITIONAL STUDIES:

## 2021-06-05 NOTE — DIETITIAN INITIAL EVALUATION ADULT. - LITERATURE/VIDEOS GIVEN
Defer nutritional counseling & educational material until after testing & diet is advanced to solid food

## 2021-06-05 NOTE — DIETITIAN INITIAL EVALUATION ADULT. - PERTINENT LABORATORY DATA
06-04 Na143 mmol/L Glu 122 mg/dL<H> K+ 4.2 mmol/L Cr  1.28 mg/dL BUN 36 mg/dL<H> 06-04 Alb 2.7 g/dL<L>

## 2021-06-05 NOTE — PROGRESS NOTE ADULT - ASSESSMENT
46 year old male w hx gastric PUD came to ED by EMS w syncope, melena, blood per rectum      Acute blood loss anemia secondary to Acute UGI bleed  in pt w hx gastric PUD, FOBT+  s/p protonix 40 mg IV  protonix gtt  no further active bleeding    Plan:  serial CBC  fall precaution  Tranfuse > 8  active type and cross  Clears    Syncope / Tachycardia  likely vasovagal due to acute blood loss  trops x 2 negative  on tele  -check orthostatic  -IVF  -maintain tele    dvt ppx: scds due to GI bleed

## 2021-06-05 NOTE — DIETITIAN INITIAL EVALUATION ADULT. - OTHER CALCULATIONS
Ht (cm):  167.6     Wt (kg):   102.1 6/4)    BMI:   36.3    IBW:  64.5 kg   %IBW:  158%   UBW:  106.8 kg   %UBW: 96%

## 2021-06-05 NOTE — CONSULT NOTE ADULT - ASSESSMENT
HPI:  46 year old male w hx gastric PUD came to ED by EMS w syncope, melena, blood per rectum  ----------------- As Above --------------  Patient had been in his USOH until Tuesday when he developed sandie umbilical pain best described as sharp. Caused decreased appetite but food resolved the pain. The pain lasted ~ 2 - 3 days. He started seeing dark brown stool Wednesday. Initially, it was tarry. Patient on ASA 81 QD. N/V  Patient had one episode of the same symptoms in 2019. He had an EGD which revealed healed PUD and subsequently had a cholecystectomy.   Last dark brown stool was 2 PM today.   Never had a colonoscopy  Patient  states that the ER looked at his stools and said that it was blood     Over the past week pt developed fatigue  Tuesday 06-01 fled epigastric pain like ulcer was flaring up  +dark blood per rectum last night and today w melena  +syncope while defecating after 2nd BM today w blood and melena  wife called 911  +pain epigastric area w/o radiation  +diaphoresis  +felt hot today but had a normal temp when he checked  has had some back pain recently  does take ASA 81 mg qd as per PCP    Felt the same epigastric pain he had when he was admitted to [facility] 2019 when he had acute cholecystitis and had lap anthony  Has had EGD in past and was told of a healed gastric peptic ulcer but did not follow up afterwards.  Does not know if he had H. pylori    melena, On NSAIDs -   1) Clear liquid diet and advance slowly 2) Daily CBC 3) EGD Monday 4) IV PPI 5) Hold NSAIDs

## 2021-06-06 LAB
ANION GAP SERPL CALC-SCNC: 7 MMOL/L — SIGNIFICANT CHANGE UP (ref 5–17)
BASOPHILS # BLD AUTO: 0.04 K/UL — SIGNIFICANT CHANGE UP (ref 0–0.2)
BASOPHILS NFR BLD AUTO: 0.5 % — SIGNIFICANT CHANGE UP (ref 0–2)
BUN SERPL-MCNC: 15 MG/DL — SIGNIFICANT CHANGE UP (ref 7–23)
CALCIUM SERPL-MCNC: 7.7 MG/DL — LOW (ref 8.5–10.1)
CHLORIDE SERPL-SCNC: 108 MMOL/L — SIGNIFICANT CHANGE UP (ref 96–108)
CO2 SERPL-SCNC: 26 MMOL/L — SIGNIFICANT CHANGE UP (ref 22–31)
CREAT SERPL-MCNC: 1.04 MG/DL — SIGNIFICANT CHANGE UP (ref 0.5–1.3)
EOSINOPHIL # BLD AUTO: 0.17 K/UL — SIGNIFICANT CHANGE UP (ref 0–0.5)
EOSINOPHIL NFR BLD AUTO: 2.2 % — SIGNIFICANT CHANGE UP (ref 0–6)
FLUAV AG NPH QL: SIGNIFICANT CHANGE UP
FLUBV AG NPH QL: SIGNIFICANT CHANGE UP
GLUCOSE SERPL-MCNC: 93 MG/DL — SIGNIFICANT CHANGE UP (ref 70–99)
HCT VFR BLD CALC: 31.3 % — LOW (ref 39–50)
HGB BLD-MCNC: 10.5 G/DL — LOW (ref 13–17)
IMM GRANULOCYTES NFR BLD AUTO: 0.5 % — SIGNIFICANT CHANGE UP (ref 0–1.5)
LYMPHOCYTES # BLD AUTO: 1.76 K/UL — SIGNIFICANT CHANGE UP (ref 1–3.3)
LYMPHOCYTES # BLD AUTO: 22.4 % — SIGNIFICANT CHANGE UP (ref 13–44)
MAGNESIUM SERPL-MCNC: 2.2 MG/DL — SIGNIFICANT CHANGE UP (ref 1.6–2.6)
MCHC RBC-ENTMCNC: 30.8 PG — SIGNIFICANT CHANGE UP (ref 27–34)
MCHC RBC-ENTMCNC: 33.5 GM/DL — SIGNIFICANT CHANGE UP (ref 32–36)
MCV RBC AUTO: 91.8 FL — SIGNIFICANT CHANGE UP (ref 80–100)
MONOCYTES # BLD AUTO: 0.78 K/UL — SIGNIFICANT CHANGE UP (ref 0–0.9)
MONOCYTES NFR BLD AUTO: 9.9 % — SIGNIFICANT CHANGE UP (ref 2–14)
NEUTROPHILS # BLD AUTO: 5.05 K/UL — SIGNIFICANT CHANGE UP (ref 1.8–7.4)
NEUTROPHILS NFR BLD AUTO: 64.5 % — SIGNIFICANT CHANGE UP (ref 43–77)
NRBC # BLD: 0 /100 WBCS — SIGNIFICANT CHANGE UP (ref 0–0)
PHOSPHATE SERPL-MCNC: 3.5 MG/DL — SIGNIFICANT CHANGE UP (ref 2.5–4.5)
PLATELET # BLD AUTO: 213 K/UL — SIGNIFICANT CHANGE UP (ref 150–400)
POTASSIUM SERPL-MCNC: 3.9 MMOL/L — SIGNIFICANT CHANGE UP (ref 3.5–5.3)
POTASSIUM SERPL-SCNC: 3.9 MMOL/L — SIGNIFICANT CHANGE UP (ref 3.5–5.3)
RBC # BLD: 3.41 M/UL — LOW (ref 4.2–5.8)
RBC # FLD: 13 % — SIGNIFICANT CHANGE UP (ref 10.3–14.5)
SARS-COV-2 RNA SPEC QL NAA+PROBE: SIGNIFICANT CHANGE UP
SODIUM SERPL-SCNC: 141 MMOL/L — SIGNIFICANT CHANGE UP (ref 135–145)
WBC # BLD: 7.84 K/UL — SIGNIFICANT CHANGE UP (ref 3.8–10.5)
WBC # FLD AUTO: 7.84 K/UL — SIGNIFICANT CHANGE UP (ref 3.8–10.5)

## 2021-06-06 PROCEDURE — 99233 SBSQ HOSP IP/OBS HIGH 50: CPT

## 2021-06-06 RX ADMIN — PANTOPRAZOLE SODIUM 10 MG/HR: 20 TABLET, DELAYED RELEASE ORAL at 11:50

## 2021-06-06 RX ADMIN — PANTOPRAZOLE SODIUM 10 MG/HR: 20 TABLET, DELAYED RELEASE ORAL at 05:46

## 2021-06-06 NOTE — PROGRESS NOTE ADULT - ASSESSMENT
HPI:  46 year old male w hx gastric PUD came to ED by EMS w syncope, melena, blood per rectum  ----------------- As Above --------------  Patient had been in his USOH until Tuesday when he developed sandie umbilical pain best described as sharp. Caused decreased appetite but food resolved the pain. The pain lasted ~ 2 - 3 days. He started seeing dark brown stool Wednesday. Initially, it was tarry. Patient on ASA 81 QD. N/V  Patient had one episode of the same symptoms in 2019. He had an EGD which revealed healed PUD and subsequently had a cholecystectomy.   Last dark brown stool was 2 PM today.   Never had a colonoscopy  Patient  states that the ER looked at his stools and said that it was blood     Over the past week pt developed fatigue  Tuesday 06-01 fled epigastric pain like ulcer was flaring up  +dark blood per rectum last night and today w melena  +syncope while defecating after 2nd BM today w blood and melena  wife called 911  +pain epigastric area w/o radiation  +diaphoresis  +felt hot today but had a normal temp when he checked  has had some back pain recently  does take ASA 81 mg qd as per PCP    Felt the same epigastric pain he had when he was admitted to [facility] 2019 when he had acute cholecystitis and had lap anthony  Has had EGD in past and was told of a healed gastric peptic ulcer but did not follow up afterwards.  Does not know if he had H. pylori    melena, On NSAIDs -  resolved , HGB 10.5   On solid diet  1) Daily CBC 2) EGD Monday 3) IV PPI 4) Hold NSAIDs

## 2021-06-06 NOTE — PROGRESS NOTE ADULT - ASSESSMENT
46 year old male w hx gastric PUD came to ED by EMS w syncope, melena, blood per rectum. Admitted to medicine for synocpe and LGIB.    Acute blood loss anemia secondary to Acute UGI bleed  Patient has significant hx gastric PUD, FOBT+  s/p protonix 40 mg IV  on protonix gtt  no further active bleeding  GI consulted (Eddie)    Plan:  serial CBC  fall precaution  Tranfuse > 8  active type and cross  advance as tolerated  NPO midnight for EGD 6/7    Syncope / Tachycardia  likely vasovagal due to acute blood loss  trops x 2 negative  on tele  -check orthostatic  -IVF  -maintain tele    dvt ppx: scds due to GI bleed 46 year old male w hx gastric PUD came to ED by EMS w syncope, melena, blood per rectum. Admitted to medicine for synocpe and LGIB.    Acute blood loss anemia secondary to Acute UGI bleed  Patient has significant hx gastric PUD, FOBT+  s/p protonix 40 mg IV  on protonix gtt  no further active bleeding  GI consulted (Eddie)    Plan:  serial CBC  fall precaution  Tranfuse > 8  active type and cross  advance as tolerated  NPO midnight for EGD 6/7    Syncope / Tachycardia  likely vasovagal due to acute blood loss  trops x 2 negative  on tele  -check orthostatic  -echo  -maintain tele    dvt ppx: scds due to GI bleed 46 year old male w hx gastric PUD came to ED by EMS w syncope, melena, blood per rectum. Admitted to medicine for synocpe and LGIB.    Acute blood loss anemia secondary to Acute UGI bleed  Patient has significant hx gastric PUD, FOBT+  s/p protonix 40 mg IV  on protonix gtt  no further active bleeding  GI consulted (Eddie)    Plan:  serial CBC  fall precaution  Tranfuse > 8  active type and cross  advance as tolerated  NPO midnight for EGD 6/7    Syncope / Tachycardia  likely vasovagal due to acute blood loss  trops x 2 negative  on tele  orthostatics NEGATIVE  plan:  -echo  -maintain tele    dvt ppx: scds due to GI bleed

## 2021-06-07 ENCOUNTER — TRANSCRIPTION ENCOUNTER (OUTPATIENT)
Age: 46
End: 2021-06-07

## 2021-06-07 VITALS
HEART RATE: 92 BPM | RESPIRATION RATE: 18 BRPM | SYSTOLIC BLOOD PRESSURE: 128 MMHG | OXYGEN SATURATION: 99 % | DIASTOLIC BLOOD PRESSURE: 80 MMHG | TEMPERATURE: 98 F

## 2021-06-07 LAB
ANION GAP SERPL CALC-SCNC: 4 MMOL/L — LOW (ref 5–17)
BUN SERPL-MCNC: 13 MG/DL — SIGNIFICANT CHANGE UP (ref 7–23)
CALCIUM SERPL-MCNC: 8 MG/DL — LOW (ref 8.5–10.1)
CHLORIDE SERPL-SCNC: 110 MMOL/L — HIGH (ref 96–108)
CO2 SERPL-SCNC: 28 MMOL/L — SIGNIFICANT CHANGE UP (ref 22–31)
CREAT SERPL-MCNC: 1 MG/DL — SIGNIFICANT CHANGE UP (ref 0.5–1.3)
GLUCOSE SERPL-MCNC: 92 MG/DL — SIGNIFICANT CHANGE UP (ref 70–99)
HCT VFR BLD CALC: 30.1 % — LOW (ref 39–50)
HGB BLD-MCNC: 10.2 G/DL — LOW (ref 13–17)
MCHC RBC-ENTMCNC: 31.3 PG — SIGNIFICANT CHANGE UP (ref 27–34)
MCHC RBC-ENTMCNC: 33.9 GM/DL — SIGNIFICANT CHANGE UP (ref 32–36)
MCV RBC AUTO: 92.3 FL — SIGNIFICANT CHANGE UP (ref 80–100)
NRBC # BLD: 0 /100 WBCS — SIGNIFICANT CHANGE UP (ref 0–0)
PLATELET # BLD AUTO: 206 K/UL — SIGNIFICANT CHANGE UP (ref 150–400)
POTASSIUM SERPL-MCNC: 4 MMOL/L — SIGNIFICANT CHANGE UP (ref 3.5–5.3)
POTASSIUM SERPL-SCNC: 4 MMOL/L — SIGNIFICANT CHANGE UP (ref 3.5–5.3)
RBC # BLD: 3.26 M/UL — LOW (ref 4.2–5.8)
RBC # FLD: 12.8 % — SIGNIFICANT CHANGE UP (ref 10.3–14.5)
SODIUM SERPL-SCNC: 142 MMOL/L — SIGNIFICANT CHANGE UP (ref 135–145)
WBC # BLD: 6.1 K/UL — SIGNIFICANT CHANGE UP (ref 3.8–10.5)
WBC # FLD AUTO: 6.1 K/UL — SIGNIFICANT CHANGE UP (ref 3.8–10.5)

## 2021-06-07 PROCEDURE — 99239 HOSP IP/OBS DSCHRG MGMT >30: CPT

## 2021-06-07 RX ORDER — ASPIRIN/CALCIUM CARB/MAGNESIUM 324 MG
1 TABLET ORAL
Qty: 0 | Refills: 0 | DISCHARGE

## 2021-06-07 RX ORDER — PANTOPRAZOLE SODIUM 20 MG/1
40 TABLET, DELAYED RELEASE ORAL
Refills: 0 | Status: DISCONTINUED | OUTPATIENT
Start: 2021-06-07 | End: 2021-06-07

## 2021-06-07 RX ORDER — SUCRALFATE 1 G
1 TABLET ORAL
Qty: 120 | Refills: 0
Start: 2021-06-07

## 2021-06-07 RX ORDER — SUCRALFATE 1 G
1 TABLET ORAL
Refills: 0 | Status: DISCONTINUED | OUTPATIENT
Start: 2021-06-07 | End: 2021-06-07

## 2021-06-07 RX ORDER — PANTOPRAZOLE SODIUM 20 MG/1
1 TABLET, DELAYED RELEASE ORAL
Qty: 60 | Refills: 0
Start: 2021-06-07

## 2021-06-07 RX ADMIN — PANTOPRAZOLE SODIUM 10 MG/HR: 20 TABLET, DELAYED RELEASE ORAL at 01:59

## 2021-06-07 NOTE — DISCHARGE NOTE NURSING/CASE MANAGEMENT/SOCIAL WORK - PATIENT PORTAL LINK FT
You can access the FollowMyHealth Patient Portal offered by [facility] by registering at the following website: [email or web address]. By joining [employer]’s FollowMyHealth portal, you will also be able to view your health information using other applications (apps) compatible with our system.

## 2021-06-07 NOTE — PROGRESS NOTE ADULT - REASON FOR ADMISSION
UGI bleed  Syncope  Tachycardia

## 2021-06-07 NOTE — PROGRESS NOTE ADULT - ASSESSMENT
46 year old male w hx gastric PUD came to ED by EMS w syncope, melena, blood per rectum. Admitted to medicine for synocpe and LGIB.    # Acute blood loss anemia secondary to Acute UGI bleed - Patient has significant hx gastric PUD, FOBT+.  Hgb has been stable.  No s/s of further bleeding.  No dizziness / lightheadedness.  Discussed need for further workup with GI - was unable to have EGD today as machine not functioning per GI.  PPI.   # Syncope / Tachycardia - likely vasovagal due to acute blood loss.  Patient had pain prior to episode.  Otherwise no h/o syncope.  Good ET.  Discussed outpatient f/u with PMD.  No significant arrhythmias on Tele.  Orthostatics negative.  Stable for d/c home.  I d/w Dr. Morgan.  I d/w patient and wife.

## 2021-06-07 NOTE — DISCHARGE NOTE PROVIDER - CARE PROVIDER_API CALL
[place] [address]  Phone: [phone]  Fax: [phone]  Follow Up Time:     PMD,   Phone: (   )    -  Fax: (   )    -  Follow Up Time:

## 2021-06-07 NOTE — DISCHARGE NOTE PROVIDER - NSDCCPCAREPLAN_GEN_ALL_CORE_FT
PRINCIPAL DISCHARGE DIAGNOSIS  Diagnosis: GIB (gastrointestinal bleeding)  Assessment and Plan of Treatment:       SECONDARY DISCHARGE DIAGNOSES  Diagnosis: Anemia due to acute blood loss  Assessment and Plan of Treatment:     Diagnosis: GIB (gastrointestinal bleeding)  Assessment and Plan of Treatment:     Diagnosis: Vasovagal syncope  Assessment and Plan of Treatment:

## 2021-06-07 NOTE — DISCHARGE NOTE PROVIDER - NSDCMRMEDTOKEN_GEN_ALL_CORE_FT
pantoprazole 40 mg oral delayed release tablet: 1 tab(s) orally 2 times a day  sucralfate 1 g oral tablet: 1 tab(s) orally 4 times a day

## 2021-06-07 NOTE — PRE-OP CHECKLIST - HEIGHT IN FEET
HOSPITALIST  PROGRESS NOTE:    Patient: Kevin John Attending: Evan Ronquillo MD   : 1950 Date: 2021 11:53 AM   AGE: 70 year old Current Hospital Day: Hospital Day: 4   SEX:  male      Hospital Day #: Hospital Day: 4.    Chief Complaint:    Chief Complaint   Patient presents with   • Altered Mental Status         Kevin John is a 70 year old male who was admitted on 2021 for altered mental status found to have acute renal failure. On admission creatinine was 6.5. his baseline is unclear since his record is int [facility]       Subjective:   No overnight event.  He is making high urine output with 5.8 L over 24 hours.  Patient is awake and alert.  He is able to tell me his name, able to tell me the month.  He is able to tell me in the hospital but he does not know why he came to the hospital.   Patient reports no chest pain or shortness of breath.  No abdominal pain.    Reviewed Pertinent: Allergies, Medications, Medical History, Surgical History, Social History, Family History, Radiology, Labs and Old Records.      ROS except that mentioned above is negative.    Objective:       Intake/Output:      I&O Last shift: I/O this shift:  In: 240 [P.O.:240]  Out: 625 [Urine:625]    I&O Last 24 hours:     Intake/Output Summary (Last 24 hours) at 2021 1153  Last data filed at 2021 0823  Gross per 24 hour   Intake 358 ml   Output 6675 ml   Net -6317 ml       Last Stool Occurrence: 1 (21 1000)    Vitals 24 Hour Range Last Value   Temperature Temp  Min: 98.6 °F (37 °C)  Max: 99 °F (37.2 °C) 98.8 °F (37.1 °C) (21)   Pulse Pulse  Min: 80  Max: 85 85 (21)   Respiratory Resp  Min: 18  Max: 18 18 (21)   Non-Invasive Blood Pressure BP  Min: 139/69  Max: 164/75 (!) 157/77 (21)   Pulse Oximetry SpO2  Min: 90 %  Max: 93 % 90 % (21)       Vital Most Recent Value First Value   Weight 81.6 kg Weight: 84 kg   Height 5' 5\" (165.1 cm) Height: 5'  5\" (165.1 cm)     Physical Exam:    Vitals:    02/02/21 0711   BP: (!) 157/77   Pulse: 85   Resp: 18   Temp: 98.8 °F (37.1 °C)     I/O last 3 completed shifts:  In: 118 [P.O.:118]  Out: 7700 [Urine:7700]  I/O this shift:  In: 240 [P.O.:240]  Out: 625 [Urine:625]    General:  Awake and oriented   HEENT: EDEL, No conjunctival pallor, ear canals clear,  no nasal discharge, oral mucosa appears monitst.  Lungs: B/L air entry equal. No Wheezes. No Crepitation. No dullness on persussion.  Cardiovascular: Normal S 1 and S 2, no S 3, S 4. No murmurs.   Abdomen:  Soft, non-tender, non-distended, no hepatosplenomegaly, Bowel sounds are heard  Musculoskeletal:  No edema in Rt or Lt leg. Normal ROM B/L  Skin: Warm and perfused. No rashes.  Neuropsych:  oriented to time, place and person, no confusion. No focal weakness       Laboratory Results:  Recent Labs   Lab 02/02/21 0524 01/31/21  0521  01/30/21  1815   SODIUM 137   < > 126*   < > 123*   POTASSIUM 2.8*   < > 2.6*  --  2.5*   CHLORIDE 100   < > 88*  --  82*   CO2 29   < > 28  --  31   BUN 21*   < > 33*  --  32*   CREATININE 3.14*   < > 6.54*  --  6.21*   GLUCOSE 76   < > 103*  --  78   ALBUMIN  --   --   --   --  3.1*   AST  --   --   --   --  13   BILIRUBIN  --   --   --   --  0.3   CPK  --   --  188  --   --     < > = values in this interval not displayed.     Recent Labs   Lab 02/02/21  0524   WBC 11.7*   HGB 11.2*   HCT 32.3*          URINE  Recent Labs   Lab 01/31/21  0907   USPG 1.010   UPH 6.0   UKET Negative   UPROT Trace*   UGLU Negative   UBILI Negative   UROB 0.2   UWBC Negative   UNITR Negative   URBC Moderate*   RAYMUNDO 53   UCR 82.88  85.34   UOSM 217       Cultures:  Pending    Radiology Results:  US Renal Complete (Comp Urinary System)   Final Result   IMPRESSION:     1. Right kidney with a small cyst lower pole 1.5 cm. No hydronephrosis.   Right kidney otherwise unremarkable with preserved normal appearing   cortical thickness and echotexture.   2.  Atrophic left kidney with diffuse increased echoes with thinned cortex.   This suggest chronic disease possibly secondary to renal vascular disease.   Correlate clinically. A simple cyst at the upper pole up to 4.4 cm noted.   No hydronephrosis.   3. Bladder not well distended. However, ureteral jets are confirmed.               XR Chest AP or PA   Final Result   IMPRESSION: Minimal subtle haziness of the lungs could be due to portable   technique body habitus artifact. Mild condition or mild pneumonitis not   excluded. There is no pleural effusion. There is no focal lobar   consolidation.          CT HEAD WO CONTRAST   Final Result          Assessment and Plan:     #.  Acute renal failure   #.  Acute metabolic encephalopathy:   #.  Type 2 diabetes mellitus.    #.  Hypokalemia.   #   Hi coup : resolved   #.  Core artery disease.    #.  History of gout.        Medical decision making and plan    Creatinine is improving.  Today it is 3.14.  Continue IV hydration.  His urine output is good he had 5.4 L output in 24 hours  His mental status has cleared.  He is alert and oriented x3.  Acute metabolic encephalopathy is due to acute renal failure.  It is improving  His potassium is 2.8.  Will supplement 40 mEq KCL orally  Continue amlodipine, metoprolol, aspirin and allopurinol  Continue with sliding scale insulin  Continues famotidine    Diet:renal diet      Disposition:  TBD. Likely 1-2 day    Best practice:  DVT prophylaxis:Heparin 5000 units sub q tid  Castillo in Place: No  Central Line: No    Discussed with patient on RN.  I called his wife to update her.  Phone was not answered.    Code Status:     Full Resuscitation    Evan Ronquillo MD    2/2/2021  11:53 AM                          5

## 2021-06-07 NOTE — DISCHARGE NOTE PROVIDER - HOSPITAL COURSE
46 year old male w hx gastric PUD came to ED by EMS w syncope, melena, blood per rectum. Admitted to medicine for synocpe and LGIB.    # Acute blood loss anemia secondary to Acute UGI bleed - Patient has significant hx gastric PUD, FOBT+.  Hgb has been stable.  No s/s of further bleeding.  No dizziness / lightheadedness.  Discussed need for further workup with GI - was unable to have EGD today as machine not functioning per GI.  PPI.   # Syncope / Tachycardia - likely vasovagal due to acute blood loss.  Patient had pain prior to episode.  Otherwise no h/o syncope.  Good ET.  Discussed outpatient f/u with PMD.  No significant arrhythmias on Tele.  Orthostatics negative.  Stable for d/c home.  I d/w Dr. Morgan.  I d/w patient and wife.     Disposition: Stable for discharge.  Outpatient followup discussed.  Total time spent on discharge is  35  minutes.

## 2021-06-07 NOTE — DISCHARGE NOTE PROVIDER - NSDCFUADDINST_GEN_ALL_CORE_FT
It is important to see your primary physician as well as the physicians noted below within the next week to perform a comprehensive medical review.  Call their offices for an appointment as soon as you leave the hospital.  You will also need to see them for renewal of your medications.  If you do not have a primary physician, contact the [facility] Physician Referral Service at [phone].  To obtain your results, you can access the [employer] [facility] Patient Portal at [email or web address].  Your medical issues appear to be stable at this time, but if your symptoms recur or worsen, contact your physicians and/or return to the hospital if necessary.  If you encounter any issues or questions with your medication, call your physicians before stopping the medication.  Do not drive.

## 2021-06-07 NOTE — PROGRESS NOTE ADULT - ASSESSMENT
HPI:  46 year old male w hx gastric PUD came to ED by EMS w syncope, melena, blood per rectum  ----------------- As Above --------------  Patient had been in his USOH until Tuesday when he developed sandie umbilical pain best described as sharp. Caused decreased appetite but food resolved the pain. The pain lasted ~ 2 - 3 days. He started seeing dark brown stool Wednesday. Initially, it was tarry. Patient on ASA 81 QD. N/V  Patient had one episode of the same symptoms in 2019. He had an EGD which revealed healed PUD and subsequently had a cholecystectomy.   Last dark brown stool was 2 PM today.   Never had a colonoscopy  Patient  states that the ER looked at his stools and said that it was blood     Over the past week pt developed fatigue  Tuesday 06-01 fled epigastric pain like ulcer was flaring up  +dark blood per rectum last night and today w melena  +syncope while defecating after 2nd BM today w blood and melena  wife called 911  +pain epigastric area w/o radiation  +diaphoresis  +felt hot today but had a normal temp when he checked  has had some back pain recently  does take ASA 81 mg qd as per PCP    Felt the same epigastric pain he had when he was admitted to [facility] 2019 when he had acute cholecystitis and had lap anthony  Has had EGD in past and was told of a healed gastric peptic ulcer but did not follow up afterwards.  Does not know if he had H. pylori    melena, On NSAIDs -  resolved , HGB 10.5 --> 10.2    On solid diet  EGD cancelled because of equipment problems.  1) Daily CBC 2) EGD Thursday  3) PO PPI 4) Hold NSAIDs  ===== Patient stable for discharge and can have EGD as out patient. Continue to hold NSAIDs  ===== Patient should also have colonoscopy as out patient. Discussed for 10 minutes HPI:  46 year old male w hx gastric PUD came to ED by EMS w syncope, melena, blood per rectum  ----------------- As Above --------------  Patient had been in his USOH until Tuesday when he developed sandie umbilical pain best described as sharp. Caused decreased appetite but food resolved the pain. The pain lasted ~ 2 - 3 days. He started seeing dark brown stool Wednesday. Initially, it was tarry. Patient on ASA 81 QD. N/V  Patient had one episode of the same symptoms in 2019. He had an EGD which revealed healed PUD and subsequently had a cholecystectomy.   Last dark brown stool was 2 PM today.   Never had a colonoscopy  Patient  states that the ER looked at his stools and said that it was blood     Over the past week pt developed fatigue  Tuesday 06-01 fled epigastric pain like ulcer was flaring up  +dark blood per rectum last night and today w melena  +syncope while defecating after 2nd BM today w blood and melena  wife called 911  +pain epigastric area w/o radiation  +diaphoresis  +felt hot today but had a normal temp when he checked  has had some back pain recently  does take ASA 81 mg qd as per PCP    Felt the same epigastric pain he had when he was admitted to [facility] 2019 when he had acute cholecystitis and had lap anthony  Has had EGD in past and was told of a healed gastric peptic ulcer but did not follow up afterwards.  Does not know if he had H. pylori    melena, On NSAIDs -  resolved , HGB 10.5 --> 10.2    On solid diet  EGD cancelled because of equipment problems.  1) Daily CBC 2) EGD Thursday  3) PO PPI 4) Hold NSAIDs  ===== Patient stable for discharge and can have EGD as out patient. Continue to hold NSAIDs  ===== Patient should also have colonoscopy as out patient. Discussed for 10 minutes  ========= Patient has my name and number

## 2021-06-15 DIAGNOSIS — R00.0 TACHYCARDIA, UNSPECIFIED: ICD-10-CM

## 2021-06-15 DIAGNOSIS — R55 SYNCOPE AND COLLAPSE: ICD-10-CM

## 2021-06-15 DIAGNOSIS — Z87.11 PERSONAL HISTORY OF PEPTIC ULCER DISEASE: ICD-10-CM

## 2021-06-15 DIAGNOSIS — K92.2 GASTROINTESTINAL HEMORRHAGE, UNSPECIFIED: ICD-10-CM

## 2021-06-15 DIAGNOSIS — K92.1 MELENA: ICD-10-CM

## 2021-06-15 DIAGNOSIS — Z91.013 ALLERGY TO SEAFOOD: ICD-10-CM

## 2021-06-15 DIAGNOSIS — D62 ACUTE POSTHEMORRHAGIC ANEMIA: ICD-10-CM

## 2021-06-27 DIAGNOSIS — Z01.818 ENCOUNTER FOR OTHER PREPROCEDURAL EXAMINATION: ICD-10-CM

## 2021-06-28 ENCOUNTER — APPOINTMENT (OUTPATIENT)
Dept: DISASTER EMERGENCY | Facility: CLINIC | Age: 46
End: 2021-06-28

## 2021-06-29 LAB — SARS-COV-2 N GENE NPH QL NAA+PROBE: NOT DETECTED

## 2021-06-30 ENCOUNTER — TRANSCRIPTION ENCOUNTER (OUTPATIENT)
Age: 46
End: 2021-06-30

## 2021-07-01 ENCOUNTER — RESULT REVIEW (OUTPATIENT)
Age: 46
End: 2021-07-01

## 2021-07-01 ENCOUNTER — OUTPATIENT (OUTPATIENT)
Dept: OUTPATIENT SERVICES | Facility: HOSPITAL | Age: 46
LOS: 1 days | Discharge: HOME | End: 2021-07-01
Payer: COMMERCIAL

## 2021-07-01 VITALS
HEART RATE: 78 BPM | DIASTOLIC BLOOD PRESSURE: 73 MMHG | SYSTOLIC BLOOD PRESSURE: 128 MMHG | RESPIRATION RATE: 18 BRPM | WEIGHT: 186.07 LBS | TEMPERATURE: 99 F | HEIGHT: 70 IN | OXYGEN SATURATION: 99 %

## 2021-07-01 VITALS
SYSTOLIC BLOOD PRESSURE: 120 MMHG | DIASTOLIC BLOOD PRESSURE: 77 MMHG | HEART RATE: 78 BPM | OXYGEN SATURATION: 100 % | RESPIRATION RATE: 21 BRPM | TEMPERATURE: 98 F

## 2021-07-01 DIAGNOSIS — Z12.11 ENCOUNTER FOR SCREENING FOR MALIGNANT NEOPLASM OF COLON: ICD-10-CM

## 2021-07-01 DIAGNOSIS — Z01.818 ENCOUNTER FOR OTHER PREPROCEDURAL EXAMINATION: ICD-10-CM

## 2021-07-01 DIAGNOSIS — K82.9 DISEASE OF GALLBLADDER, UNSPECIFIED: Chronic | ICD-10-CM

## 2021-07-01 PROCEDURE — 88305 TISSUE EXAM BY PATHOLOGIST: CPT | Mod: 26

## 2021-07-01 PROCEDURE — 88312 SPECIAL STAINS GROUP 1: CPT | Mod: 26

## 2021-07-01 RX ORDER — SODIUM CHLORIDE 9 MG/ML
1000 INJECTION, SOLUTION INTRAVENOUS
Refills: 0 | Status: DISCONTINUED | OUTPATIENT
Start: 2021-07-01 | End: 2021-07-01

## 2021-07-01 RX ORDER — ONDANSETRON 8 MG/1
4 TABLET, FILM COATED ORAL ONCE
Refills: 0 | Status: DISCONTINUED | OUTPATIENT
Start: 2021-07-01 | End: 2021-07-01

## 2021-07-01 NOTE — ASU DISCHARGE PLAN (ADULT/PEDIATRIC) - ASU DC SPECIAL INSTRUCTIONSFT
Resume all previous medications    Please call Dr. Morgan's office at  to been seen in a follow up office visit two weeks after the completion of your procedure.

## 2021-07-01 NOTE — ASU DISCHARGE PLAN (ADULT/PEDIATRIC) - CARE PROVIDER_API CALL
[place] [address]  Phone: [phone]  Fax: [phone]  Follow Up Time: 2 weeks

## 2021-07-02 LAB
SURGICAL PATHOLOGY STUDY: SIGNIFICANT CHANGE UP
SURGICAL PATHOLOGY STUDY: SIGNIFICANT CHANGE UP

## 2021-07-15 DIAGNOSIS — Z87.11 PERSONAL HISTORY OF PEPTIC ULCER DISEASE: ICD-10-CM

## 2021-07-15 DIAGNOSIS — K29.50 UNSPECIFIED CHRONIC GASTRITIS WITHOUT BLEEDING: ICD-10-CM

## 2021-07-15 DIAGNOSIS — B96.81 HELICOBACTER PYLORI [H. PYLORI] AS THE CAUSE OF DISEASES CLASSIFIED ELSEWHERE: ICD-10-CM

## 2021-07-15 DIAGNOSIS — K57.30 DIVERTICULOSIS OF LARGE INTESTINE WITHOUT PERFORATION OR ABSCESS WITHOUT BLEEDING: ICD-10-CM

## 2021-07-15 DIAGNOSIS — D12.6 BENIGN NEOPLASM OF COLON, UNSPECIFIED: ICD-10-CM

## 2021-07-15 DIAGNOSIS — Z91.013 ALLERGY TO SEAFOOD: ICD-10-CM

## 2021-07-15 DIAGNOSIS — F17.290 NICOTINE DEPENDENCE, OTHER TOBACCO PRODUCT, UNCOMPLICATED: ICD-10-CM

## 2022-01-30 NOTE — ED ADULT NURSE NOTE - SUICIDE SCREENING QUESTION 2
INPATIENT NEPHROLOGY CONSULT   [facility]    Patient Name: Damaris Canales  AGUEDA- intubated- consulted for AGUEDA.    Stevie Orlando MD MPH  [facility]  [address] [place] [id number]  [phone] (p)  [phone] (f)   No

## 2022-06-01 NOTE — ED ADULT NURSE NOTE - CHIEF COMPLAINT
Discharge instructions reviewed with dad, dad verbalized understanding. Removed both PIV without difficulty. Follow up appointment reviewed with dad, verbalized understanding.  Prescription of medication sent to pharmacy The patient is a 43y Male complaining of abdominal pain.

## 2023-03-10 NOTE — DISCHARGE NOTE ADULT - CONTRAINDICATIONS & PRECAUTIONS (SELECT ALL THAT APPLY)
HPI: 10 y/o male with h/o HLH, presenting to the ED after school book bag fell onto scrotum earlier today.  Reports pain and edema that has slowly been worsening since event that occurred around 3pm today.  Reports he is able to void without pain or trouble.  Denies hematuria.     PAST MEDICAL & SURGICAL HISTORY:  HLH (Hypoplastic Left Heart Syndrome)    Caries    Hyperactivity    Otitis externa    S/P Cardiac Catheterization    History of [place] shunt    S/P Fontan procedure  June 2012    H/O Hobson procedure    H/O dental restoration  5/15/15    MEDICATIONS:  Home Medications:  aspirin 81 mg oral tablet, chewable: 1 tab(s) orally once a day (24 Nov 2015 11:08)  enalapril 1 mg/mL oral liquid: 5 milliliter(s) orally 2 times a day (24 Nov 2015 11:08)  multivitamin:   once a day (24 Nov 2015 11:08)    FAMILY HISTORY:  Denies     Allergies  No Known Allergies    SOCIAL HISTORY:    REVIEW OF SYSTEMS: Otherwise negative as stated in HPI    PHYSICAL EXAM:  Vital Signs Last 24 Hrs  T(C): 36.5 (10 Mar 2023 21:34), Max: 36.5 (10 Mar 2023 21:34)  T(F): 97.7 (10 Mar 2023 21:34), Max: 97.7 (10 Mar 2023 21:34)  HR: 69 (10 Mar 2023 21:34) (69 - 69)  BP: 108/70 (10 Mar 2023 21:34) (108/70 - 108/70)  BP(mean): --  RR: 20 (10 Mar 2023 21:34) (20 - 20)  SpO2: 100% (10 Mar 2023 21:34) (100% - 100%)    Parameters below as of 10 Mar 2023 21:34  Patient On (Oxygen Delivery Method): room air    General: Awake and Alert in no acute distress    Respiratory and Thorax: no resp distress   	  Cardiovascular: regular     Gastrointestinal: soft, non tender, no distention    Genitourinary: Glans not circumcised, no penile pain, lesions or ecchymosis.  No blood at the meatus.   Left testicle descended, soft, nontender.  Right side is edematous, soft, minimal tenderness, some ecchymosis present.     Neuro: nonfocal    Psych: appropriate    RADIOLOGY:  < from: US Testicles (03.10.23 @ 22:30) >  FINDINGS:    Large hematoma primarily in the right scrotum.    RIGHT:  Right testis: 3.7 x 1.7 x 2.0 cm. Normal echogenicity and echotexture   with no masses or areas of architectural distortion. Normal arterial and   venous blood flow pattern.  Right epididymis: Within normal limits.  Right hydrocele: None.  Right varicocele: None.    There is swirling noted of the right spermatic cord.    LEFT:  Partially limited evaluation secondary to mass effect of hematoma.  Left testis: 2.6 x 1.7 x 2.0 cm. Normal echogenicity and echotexture with   no masses or areas of architectural distortion. Normal arterial and   venous blood flow pattern.  Left epididymis: Not visualized.  Left hydrocele: None.  Left varicocele: None.    IMPRESSION:    Hematoma within the right scrotum. Partially limited evaluation of the   left testicle.    Swirling noted of the right spermatic cord; however, there is preserved   blood flow to the bilateral testicles.    --- End of Report ---      SOCO ESPAÑA MD; Resident Radiologist  This document has been electronically signed.  ALENA OROZCO MD; Attending Radiologist  This document has been electronically signed. Mar 10 2023 10:52PM    < end of copied text >   Patient/surrogate refused vaccine...

## 2023-11-03 NOTE — ED PROVIDER NOTE - NS ED MD EM SELECTION
MEDICAL SCREENING:    Reason for Visit: Fever    Patient initially seen in triage.  The patient was advised further evaluation and diagnostic testing will be needed, some of the treatment and testing will be initiated in the lobby in order to begin the process.  The patient will be returned to the waiting area for the time being and possibly be re-assessed by a subsequent ED provider.  The patient will be brought back to the treatment area in as timely manner as possible.     Luz Heaton, APRN  11/03/23 0036    
Attempted to straight cath patient for urine sample. Unsuccessful. Provider aware.   
Rectal temp 99.2 checked with RN, Wee bag is empty  
Wee bag placed at this time.   
[id number] Comprehensive

## 2023-11-29 NOTE — PRE-OP CHECKLIST - BSA (M2)
2.1 Performing Laboratory: 0 Billing Type: Patient Bill Bill For Surgical Tray: yes Expected Date Of Service: 11/29/2023

## 2024-06-20 NOTE — DIETITIAN INITIAL EVALUATION ADULT. - PERTINENT MEDS FT
Gen: awake, alert, WD, WN, NAD  Head:  NC, AT  ENT:  PERRL, moist mucous membranes, OP-clear  Neck: supple, nontender  CV: tachycardic and irregular  Pulm:  CTAB, good air movement  Abd:  Soft, nontender, nondistended, +BS, no rebound/guarding  Back:  no CVAT  Ext:  warm, well perfused, no peripheral edema, distal pulses intact  Skin: intact  Neuro:  A&Ox4, speech fluent, 2/5 strength LUE, 5/5 strength in RUE, pronator drift LUE, drift of the LLE Protonix

## 2025-01-19 ENCOUNTER — EMERGENCY (EMERGENCY)
Facility: HOSPITAL | Age: 50
LOS: 0 days | Discharge: HOME | End: 2025-01-19
Attending: STUDENT IN AN ORGANIZED HEALTH CARE EDUCATION/TRAINING PROGRAM
Payer: COMMERCIAL

## 2025-01-19 VITALS
SYSTOLIC BLOOD PRESSURE: 179 MMHG | WEIGHT: 229.94 LBS | HEART RATE: 99 BPM | OXYGEN SATURATION: 98 % | RESPIRATION RATE: 18 BRPM | DIASTOLIC BLOOD PRESSURE: 104 MMHG | TEMPERATURE: 98 F | HEIGHT: 66 IN

## 2025-01-19 DIAGNOSIS — R29.810 FACIAL WEAKNESS: ICD-10-CM

## 2025-01-19 DIAGNOSIS — K82.9 DISEASE OF GALLBLADDER, UNSPECIFIED: Chronic | ICD-10-CM

## 2025-01-19 DIAGNOSIS — G51.0 BELL'S PALSY: ICD-10-CM

## 2025-01-19 DIAGNOSIS — Z86.69 PERSONAL HISTORY OF OTHER DISEASES OF THE NERVOUS SYSTEM AND SENSE ORGANS: ICD-10-CM

## 2025-01-19 PROBLEM — Z78.9 OTHER SPECIFIED HEALTH STATUS: Chronic | Status: ACTIVE | Noted: 2021-07-01

## 2025-01-19 PROCEDURE — 99283 EMERGENCY DEPT VISIT LOW MDM: CPT

## 2025-01-19 RX ORDER — VALACYCLOVIR HYDROCHLORIDE 1000 MG/1
1 TABLET, FILM COATED ORAL
Qty: 21 | Refills: 0
Start: 2025-01-19 | End: 2025-01-25

## 2025-01-19 RX ORDER — POLYSORBATE 80 100 MG/10ML
1 SOLUTION/ DROPS OPHTHALMIC
Qty: 1 | Refills: 0
Start: 2025-01-19 | End: 2025-01-28

## 2025-01-19 RX ORDER — PREDNISONE 5 MG
3 TABLET ORAL
Qty: 21 | Refills: 0
Start: 2025-01-19 | End: 2025-01-25

## 2025-01-19 NOTE — ED PROVIDER NOTE - OBJECTIVE STATEMENT
This patient is a 49-year-old male presenting to the emergency department today for left-sided facial droop.  No relevant past medical history and takes no medications at home.  Of note, the patient has had Bell's palsy once roughly 20 years in the past.  He states that this feels like his Bell's palsy.  He states that his symptoms started roughly 2 days ago.  He awoke he noted that he was unable to close his left eye.  He was unable to raise his left eyebrow.  He states that he noticed left-sided facial droop.  Tried to swallow water and noticed that water was dripping on the left side of his mouth.  He believes he may have Bell's palsy today.  He states that he has no changes to his vision or hearing.  He has no chest pain or shortness of breath.  No nausea or vomiting.  No constipation or diarrhea.  No numbness or tingling in his bilateral upper or lower extremities.  No weakness in any extremity.  No other complaints at this time.

## 2025-01-19 NOTE — ED PROVIDER NOTE - NSTIMEPROVIDERCAREINITIATE_GEN_ER
Vit
Abdomen soft, nontender, nondistended, bowel sounds present in all 4 quadrants.
19-Jan-2025 11:53

## 2025-01-19 NOTE — ED PROVIDER NOTE - PHYSICAL EXAMINATION
GENERAL: The patient appears well and is in no apparent distress.    EYES: Pupils equal and reactive.  Extraocular eye movements are intact.    ENT: Head is atraumatic.  Posterior oropharynx is unremarkable.      RESPIRATORY: Lungs are clear to auscultation bilaterally.  The patient has no significant wheezing, rhonchi, or rales.    CARDIOVASCULAR: The patient has a regular rate and rhythm with no significant murmurs, rubs, or gallops.    ABDOMEN: Abdomen is soft, nondistended, and non-peritoneal.  The patient has no focal areas of tenderness.    SKIN: Skin is intact without evidence of significant lacerations or sores.    MUSCULOSKELETAL: Patient has good range of motion of all extremities.  The patient has good distal cap refill.  The patient has palpable distal pulses.  No obvious edema is noted.    NEUROLOGIC: Cranial nerves II through XII are grossly within normal limits.  Sensory and motor examination is unremarkable.  Left facial paralysis that includes the forehead.  Unable to move his left eyebrow.  Unable to close his left eyelid.  Left-sided facial droop noted.

## 2025-01-19 NOTE — ED PROVIDER NOTE - NSFOLLOWUPINSTRUCTIONS_ED_ALL_ED_FT
Araujo Palsy    WHAT YOU NEED TO KNOW:  Bell palsy is a sudden weakness or paralysis of one side of your face. Bell palsy occurs when the nerve that controls the muscles in your face becomes swollen or irritated.    DISCHARGE INSTRUCTIONS:    Medicines:  Medicine may be given to decrease swelling and irritation of your facial nerve. You may receive antiviral medicine if your healthcare provider thinks a virus caused your Bell palsy. Your healthcare provider may also suggest acetaminophen or ibuprofen to reduce pain. These medicines are available without a doctor's order. Ask which medicine to take, and how much you need. Follow directions. Acetaminophen can cause liver damage, and ibuprofen can cause stomach bleeding or kidney damage.  Take your medicine as directed. Contact your healthcare provider if you think your medicine is not helping or if you have side effects. Tell him if you are allergic to any medicine. Keep a list of the medicines, vitamins, and herbs you take. Include the amounts, and when and why you take them. Bring the list or the pill bottles to follow-up visits. Carry your medicine list with you in case of an emergency.  Follow up with your healthcare provider as directed: Write down your questions so you remember to ask them during your visits.  Eye care: Your healthcare provider may recommend that you use artificial tears during the day to keep your eye moist. You may need to use an eye ointment at night. You may also need to wear a patch over your eye and tape it shut while you sleep. This helps to keep your eye from getting dry and infected. Wear sunglasses to protect your eye from direct sunlight. Stay away from places that have fumes, dust, or other particles in the air that may harm your eye.  Physical therapy: A physical therapist may teach you how to massage your face and exercise the nerves and muscles in your face. This may help you get better sooner and prevent long-term problems. You can exercise on your own when your facial movement begins to return. Open and close your eye, wink, and smile wide. Do the exercises for 15 or 20 minutes several times a day.  Contact your healthcare provider if:  You have a fever.  Your eye becomes red, irritated, or painful.  You have questions or concerns about your condition or care.  Seek care immediately or call 911 if:  You develop weakness or numbness on one side of your body (other than your face).  You have double vision, or you lose vision in your eye.  You have trouble thinking clearly.

## 2025-01-19 NOTE — ED PROVIDER NOTE - MDM ORDERS SUBMITTED SELECTION
Pt arrived in room with O2 at 2L/min per nasal cannula from home  Pt has hx of COPD  Pt complains of shortness of breath that started 2 days, heaviness on her chest for past week, and burning sensation in her back since yesterday  
Not Applicable

## 2025-01-19 NOTE — ED PROVIDER NOTE - NS ED ROS FT
CONSTITUTIONAL: No weight loss, fever, chills, weakness or fatigue.    HEENT:    Eyes: No visual loss, blurred vision, double vision or yellow sclerae.  Ears, Nose, Throat: No hearing loss, sneezing, congestion, runny nose or sore throat.    CARDIOVASCULAR: No chest pain, chest pressure or chest discomfort. No palpitations or edema.    RESPIRATORY: No shortness of breath, cough or sputum.    GASTROINTESTINAL: No anorexia, nausea, vomiting or diarrhea. No abdominal pain or blood.    SKIN: No rash or itching.    GENITOURINARY: Patient denies any changes to bowel or bladder function.    NEUROLOGICAL: No headache, dizziness, syncope, ataxia, numbness or tingling in the extremities. No change in bowel or bladder control.  Positive for left facial paralysis    MUSCULOSKELETAL: No muscle, back pain, joint pain or stiffness.

## 2025-01-19 NOTE — ED PROVIDER NOTE - IV ALTEPLASE DOOR HIDDEN
Patient has been notified of test results. Patient instructed to start Kayexalate this morning and get another lab tomorrow morning and patient verbalized understanding. Please sign orders. show

## 2025-01-19 NOTE — ED ADULT NURSE NOTE - OBJECTIVE STATEMENT
Patient A+Ox4 presents with facial droop. Patient states symptoms started on Friday, Left side facial droop and eye brow noted, has hx of bells palsy. Patient is complaining of headache to back of head, 3/10 achy. Neuro exam otherwise intact. Patient denies any dizziness, nausea, vomiting.

## 2025-01-19 NOTE — ED PROVIDER NOTE - CLINICAL SUMMARY MEDICAL DECISION MAKING FREE TEXT BOX
This patient is a 49-year-old male presenting to the emergency department today for left-sided facial droop.  Facial paralysis is an nonforehead sparing.  He has difficulty closing his left eyelid.  Left-sided facial droop.  No numbness or tingling in his bilateral upper or lower extremities.  No weakness in his other extremities.  Patient likely has Bell's palsy.    Patient's symptoms started within 72 hours.  He will be prescribed artificial tears.  He was advised to place tape over his eye when he sleeps to keep his eyelid closed.  He also discharged home with a prescription for antivirals and steroids.  Expresses understanding and was amenable to this plan.  Well-appearing and nontoxic at this time.  Given very strict return precautions.

## 2025-01-19 NOTE — ED ADULT TRIAGE NOTE - CHIEF COMPLAINT QUOTE
Patient walked in with complaints of facial droop and jaw pain since yesterday around 0800. Patient noted with uneven eyebrow and left facial droop. Pmx: Denies

## 2025-01-19 NOTE — ED PROVIDER NOTE - PATIENT PORTAL LINK FT
You can access the FollowMyHealth Patient Portal offered by [facility] by registering at the following website: [email or web address]. By joining [employer]’[facility] FollowMyHealth portal, you will also be able to view your health information using other applications (apps) compatible with our system.

## 2025-01-25 ENCOUNTER — EMERGENCY (EMERGENCY)
Facility: HOSPITAL | Age: 50
LOS: 0 days | Discharge: HOME | End: 2025-01-25
Attending: STUDENT IN AN ORGANIZED HEALTH CARE EDUCATION/TRAINING PROGRAM
Payer: COMMERCIAL

## 2025-01-25 VITALS
TEMPERATURE: 98 F | OXYGEN SATURATION: 99 % | HEART RATE: 88 BPM | SYSTOLIC BLOOD PRESSURE: 148 MMHG | DIASTOLIC BLOOD PRESSURE: 89 MMHG | RESPIRATION RATE: 18 BRPM

## 2025-01-25 VITALS
RESPIRATION RATE: 19 BRPM | HEART RATE: 88 BPM | WEIGHT: 240.08 LBS | HEIGHT: 66 IN | SYSTOLIC BLOOD PRESSURE: 155 MMHG | OXYGEN SATURATION: 97 % | TEMPERATURE: 99 F | DIASTOLIC BLOOD PRESSURE: 95 MMHG

## 2025-01-25 DIAGNOSIS — Z87.11 PERSONAL HISTORY OF PEPTIC ULCER DISEASE: ICD-10-CM

## 2025-01-25 DIAGNOSIS — I10 ESSENTIAL (PRIMARY) HYPERTENSION: ICD-10-CM

## 2025-01-25 DIAGNOSIS — R10.9 UNSPECIFIED ABDOMINAL PAIN: ICD-10-CM

## 2025-01-25 DIAGNOSIS — R53.1 WEAKNESS: ICD-10-CM

## 2025-01-25 DIAGNOSIS — K82.9 DISEASE OF GALLBLADDER, UNSPECIFIED: Chronic | ICD-10-CM

## 2025-01-25 LAB
ALBUMIN SERPL ELPH-MCNC: 3.1 G/DL — LOW (ref 3.3–5)
ALP SERPL-CCNC: 75 U/L — SIGNIFICANT CHANGE UP (ref 40–120)
ALT FLD-CCNC: 163 U/L — HIGH (ref 12–78)
ANION GAP SERPL CALC-SCNC: 3 MMOL/L — LOW (ref 5–17)
APTT BLD: 31.1 SEC — SIGNIFICANT CHANGE UP (ref 24.5–35.6)
AST SERPL-CCNC: 96 U/L — HIGH (ref 15–37)
BASOPHILS # BLD AUTO: 0.03 K/UL — SIGNIFICANT CHANGE UP (ref 0–0.2)
BASOPHILS NFR BLD AUTO: 0.3 % — SIGNIFICANT CHANGE UP (ref 0–2)
BILIRUB SERPL-MCNC: 1 MG/DL — SIGNIFICANT CHANGE UP (ref 0.2–1.2)
BLD GP AB SCN SERPL QL: SIGNIFICANT CHANGE UP
BUN SERPL-MCNC: 17 MG/DL — SIGNIFICANT CHANGE UP (ref 7–23)
CALCIUM SERPL-MCNC: 8.8 MG/DL — SIGNIFICANT CHANGE UP (ref 8.5–10.1)
CHLORIDE SERPL-SCNC: 102 MMOL/L — SIGNIFICANT CHANGE UP (ref 96–108)
CO2 SERPL-SCNC: 32 MMOL/L — HIGH (ref 22–31)
CREAT SERPL-MCNC: 1.32 MG/DL — HIGH (ref 0.5–1.3)
EGFR: 65 ML/MIN/1.73M2 — SIGNIFICANT CHANGE UP
EGFR: 65 ML/MIN/1.73M2 — SIGNIFICANT CHANGE UP
EOSINOPHIL # BLD AUTO: 0.03 K/UL — SIGNIFICANT CHANGE UP (ref 0–0.5)
EOSINOPHIL NFR BLD AUTO: 0.3 % — SIGNIFICANT CHANGE UP (ref 0–6)
GLUCOSE SERPL-MCNC: 90 MG/DL — SIGNIFICANT CHANGE UP (ref 70–99)
HCT VFR BLD CALC: 49.2 % — SIGNIFICANT CHANGE UP (ref 39–50)
HGB BLD-MCNC: 16.5 G/DL — SIGNIFICANT CHANGE UP (ref 13–17)
IMM GRANULOCYTES NFR BLD AUTO: 0.4 % — SIGNIFICANT CHANGE UP (ref 0–0.9)
INR BLD: 1.02 RATIO — SIGNIFICANT CHANGE UP (ref 0.85–1.16)
LIDOCAIN IGE QN: 36 U/L — SIGNIFICANT CHANGE UP (ref 13–75)
LYMPHOCYTES # BLD AUTO: 2.67 K/UL — SIGNIFICANT CHANGE UP (ref 1–3.3)
LYMPHOCYTES # BLD AUTO: 26.2 % — SIGNIFICANT CHANGE UP (ref 13–44)
MCHC RBC-ENTMCNC: 31.1 PG — SIGNIFICANT CHANGE UP (ref 27–34)
MCHC RBC-ENTMCNC: 33.5 G/DL — SIGNIFICANT CHANGE UP (ref 32–36)
MCV RBC AUTO: 92.7 FL — SIGNIFICANT CHANGE UP (ref 80–100)
MONOCYTES # BLD AUTO: 1.19 K/UL — HIGH (ref 0–0.9)
MONOCYTES NFR BLD AUTO: 11.7 % — SIGNIFICANT CHANGE UP (ref 2–14)
NEUTROPHILS # BLD AUTO: 6.24 K/UL — SIGNIFICANT CHANGE UP (ref 1.8–7.4)
NEUTROPHILS NFR BLD AUTO: 61.1 % — SIGNIFICANT CHANGE UP (ref 43–77)
NRBC # BLD: 0 /100 WBCS — SIGNIFICANT CHANGE UP (ref 0–0)
NRBC BLD-RTO: 0 /100 WBCS — SIGNIFICANT CHANGE UP (ref 0–0)
OB PNL STL: POSITIVE
PLATELET # BLD AUTO: 278 K/UL — SIGNIFICANT CHANGE UP (ref 150–400)
POTASSIUM SERPL-MCNC: 4.9 MMOL/L — SIGNIFICANT CHANGE UP (ref 3.5–5.3)
POTASSIUM SERPL-SCNC: 4.9 MMOL/L — SIGNIFICANT CHANGE UP (ref 3.5–5.3)
PROT SERPL-MCNC: 7 GM/DL — SIGNIFICANT CHANGE UP (ref 6–8.3)
PROTHROM AB SERPL-ACNC: 11.8 SEC — SIGNIFICANT CHANGE UP (ref 9.9–13.4)
RBC # BLD: 5.31 M/UL — SIGNIFICANT CHANGE UP (ref 4.2–5.8)
RBC # FLD: 12.6 % — SIGNIFICANT CHANGE UP (ref 10.3–14.5)
SODIUM SERPL-SCNC: 137 MMOL/L — SIGNIFICANT CHANGE UP (ref 135–145)
WBC # BLD: 10.2 K/UL — SIGNIFICANT CHANGE UP (ref 3.8–10.5)
WBC # FLD AUTO: 10.2 K/UL — SIGNIFICANT CHANGE UP (ref 3.8–10.5)

## 2025-01-25 PROCEDURE — 74177 CT ABD & PELVIS W/CONTRAST: CPT | Mod: 26

## 2025-01-25 PROCEDURE — 99284 EMERGENCY DEPT VISIT MOD MDM: CPT

## 2025-01-25 RX ORDER — ACETAMINOPHEN 500 MG/5ML
1000 LIQUID (ML) ORAL ONCE
Refills: 0 | Status: COMPLETED | OUTPATIENT
Start: 2025-01-25 | End: 2025-01-25

## 2025-01-25 RX ADMIN — Medication 1000 MILLIGRAM(S): at 14:54

## 2025-01-25 RX ADMIN — Medication 400 MILLIGRAM(S): at 14:37

## 2025-02-01 NOTE — DISCHARGE NOTE ADULT - COMPLETE THE FOLLOWING IF THE PATIENT REFUSES THE INFLUENZA VACCINE:
Patient alert and in no distress. Patient placed on High low nasal cannula 8 lpm per O2 protocol. Will continue to monitor.   Risks/benefits discussed with patient/surrogate